# Patient Record
Sex: FEMALE | Race: WHITE | NOT HISPANIC OR LATINO | Employment: OTHER | URBAN - METROPOLITAN AREA
[De-identification: names, ages, dates, MRNs, and addresses within clinical notes are randomized per-mention and may not be internally consistent; named-entity substitution may affect disease eponyms.]

---

## 2017-01-12 PROBLEM — E78.5 HYPERLIPIDEMIA: Status: ACTIVE | Noted: 2017-01-12

## 2017-01-12 PROBLEM — G47.10 ORGANIC HYPERSOMNIA: Status: ACTIVE | Noted: 2017-01-12

## 2017-01-12 PROBLEM — I10 HYPERTENSION: Status: ACTIVE | Noted: 2017-01-12

## 2017-05-18 ENCOUNTER — OFFICE VISIT (OUTPATIENT)
Dept: NEUROLOGY | Facility: CLINIC | Age: 62
End: 2017-05-18

## 2017-05-18 VITALS
BODY MASS INDEX: 25.44 KG/M2 | HEIGHT: 64 IN | WEIGHT: 149 LBS | DIASTOLIC BLOOD PRESSURE: 88 MMHG | SYSTOLIC BLOOD PRESSURE: 130 MMHG

## 2017-05-18 DIAGNOSIS — G40.209 PARTIAL SYMPTOMATIC EPILEPSY WITH COMPLEX PARTIAL SEIZURES, NOT INTRACTABLE, WITHOUT STATUS EPILEPTICUS (HCC): ICD-10-CM

## 2017-05-18 PROCEDURE — 99213 OFFICE O/P EST LOW 20 MIN: CPT | Performed by: PSYCHIATRY & NEUROLOGY

## 2017-05-18 RX ORDER — TRAZODONE HYDROCHLORIDE 50 MG/1
50 TABLET ORAL NIGHTLY
COMMUNITY
End: 2019-03-27

## 2017-05-18 RX ORDER — ATORVASTATIN CALCIUM 20 MG/1
20 TABLET, FILM COATED ORAL DAILY
COMMUNITY

## 2017-05-18 RX ORDER — LAMOTRIGINE 300 MG/1
TABLET, EXTENDED RELEASE ORAL
Qty: 30 TABLET | Refills: 11 | Status: SHIPPED | OUTPATIENT
Start: 2017-05-18 | End: 2018-05-17 | Stop reason: SDUPTHER

## 2017-07-05 ENCOUNTER — TRANSCRIBE ORDERS (OUTPATIENT)
Dept: ADMINISTRATIVE | Facility: HOSPITAL | Age: 62
End: 2017-07-05

## 2017-07-05 DIAGNOSIS — R94.31 ABNORMAL ECG: Primary | ICD-10-CM

## 2017-08-11 ENCOUNTER — PROCEDURE VISIT (OUTPATIENT)
Dept: CARDIOLOGY | Facility: HOSPITAL | Age: 62
End: 2017-08-11

## 2017-08-11 ENCOUNTER — HOSPITAL ENCOUNTER (OUTPATIENT)
Dept: CARDIOLOGY | Facility: HOSPITAL | Age: 62
Discharge: HOME | End: 2017-08-11
Admitting: NURSE PRACTITIONER

## 2017-08-11 ENCOUNTER — OFFICE VISIT (OUTPATIENT)
Dept: CARDIOLOGY | Facility: HOSPITAL | Age: 62
End: 2017-08-11

## 2017-08-11 VITALS
SYSTOLIC BLOOD PRESSURE: 117 MMHG | WEIGHT: 155 LBS | HEIGHT: 64 IN | OXYGEN SATURATION: 95 % | DIASTOLIC BLOOD PRESSURE: 75 MMHG | TEMPERATURE: 97.1 F | RESPIRATION RATE: 18 BRPM | HEART RATE: 67 BPM | BODY MASS INDEX: 26.46 KG/M2

## 2017-08-11 VITALS — HEIGHT: 64 IN | BODY MASS INDEX: 26.46 KG/M2 | WEIGHT: 155 LBS

## 2017-08-11 DIAGNOSIS — E78.2 MIXED HYPERLIPIDEMIA: ICD-10-CM

## 2017-08-11 DIAGNOSIS — I10 ESSENTIAL HYPERTENSION: ICD-10-CM

## 2017-08-11 DIAGNOSIS — R06.02 SHORTNESS OF BREATH: ICD-10-CM

## 2017-08-11 DIAGNOSIS — R94.31 ABNORMAL EKG: Primary | ICD-10-CM

## 2017-08-11 DIAGNOSIS — R94.31 ABNORMAL EKG: ICD-10-CM

## 2017-08-11 LAB
BH CV ECHO MEAS - AO ROOT AREA (BSA CORRECTED): 1.6
BH CV ECHO MEAS - AO ROOT AREA: 6.2 CM^2
BH CV ECHO MEAS - AO ROOT DIAM: 2.8 CM
BH CV ECHO MEAS - BSA(HAYCOCK): 1.8 M^2
BH CV ECHO MEAS - BSA: 1.8 M^2
BH CV ECHO MEAS - BZI_BMI: 26.6 KILOGRAMS/M^2
BH CV ECHO MEAS - BZI_METRIC_HEIGHT: 162.6 CM
BH CV ECHO MEAS - BZI_METRIC_WEIGHT: 70.3 KG
BH CV ECHO MEAS - CONTRAST EF (2CH): 58.8 ML/M^2
BH CV ECHO MEAS - CONTRAST EF 4CH: 52.1 ML/M^2
BH CV ECHO MEAS - EDV(CUBED): 64 ML
BH CV ECHO MEAS - EDV(MOD-SP2): 80 ML
BH CV ECHO MEAS - EDV(MOD-SP4): 73 ML
BH CV ECHO MEAS - EDV(TEICH): 70 ML
BH CV ECHO MEAS - EF(CUBED): 74.7 %
BH CV ECHO MEAS - EF(MOD-SP2): 58.8 %
BH CV ECHO MEAS - EF(MOD-SP4): 52.1 %
BH CV ECHO MEAS - EF(TEICH): 67.2 %
BH CV ECHO MEAS - ESV(CUBED): 16.2 ML
BH CV ECHO MEAS - ESV(MOD-SP2): 33 ML
BH CV ECHO MEAS - ESV(MOD-SP4): 35 ML
BH CV ECHO MEAS - ESV(TEICH): 23 ML
BH CV ECHO MEAS - FS: 36.8 %
BH CV ECHO MEAS - IVS/LVPW: 1.1
BH CV ECHO MEAS - IVSD: 1.2 CM
BH CV ECHO MEAS - LA DIMENSION: 3.3 CM
BH CV ECHO MEAS - LA/AO: 1.2
BH CV ECHO MEAS - LV DIASTOLIC VOL/BSA (35-75): 41.6 ML/M^2
BH CV ECHO MEAS - LV MASS(C)D: 157.4 GRAMS
BH CV ECHO MEAS - LV MASS(C)DI: 89.7 GRAMS/M^2
BH CV ECHO MEAS - LV MAX PG: 3.1 MMHG
BH CV ECHO MEAS - LV MEAN PG: 1 MMHG
BH CV ECHO MEAS - LV SYSTOLIC VOL/BSA (12-30): 19.9 ML/M^2
BH CV ECHO MEAS - LV V1 MAX: 88 CM/SEC
BH CV ECHO MEAS - LV V1 MEAN: 53.9 CM/SEC
BH CV ECHO MEAS - LV V1 VTI: 19.5 CM
BH CV ECHO MEAS - LVIDD: 4 CM
BH CV ECHO MEAS - LVIDS: 2.5 CM
BH CV ECHO MEAS - LVLD AP2: 7.5 CM
BH CV ECHO MEAS - LVLD AP4: 7.2 CM
BH CV ECHO MEAS - LVLS AP2: 6.2 CM
BH CV ECHO MEAS - LVLS AP4: 6.3 CM
BH CV ECHO MEAS - LVOT AREA (M): 2.5 CM^2
BH CV ECHO MEAS - LVOT AREA: 2.5 CM^2
BH CV ECHO MEAS - LVOT DIAM: 1.8 CM
BH CV ECHO MEAS - LVPWD: 1.1 CM
BH CV ECHO MEAS - MV A MAX VEL: 78 CM/SEC
BH CV ECHO MEAS - MV E MAX VEL: 66.6 CM/SEC
BH CV ECHO MEAS - MV E/A: 0.85
BH CV ECHO MEAS - PA ACC SLOPE: 533 CM/SEC^2
BH CV ECHO MEAS - PA ACC TIME: 0.11 SEC
BH CV ECHO MEAS - PA PR(ACCEL): 31.3 MMHG
BH CV ECHO MEAS - RAP SYSTOLE: 8 MMHG
BH CV ECHO MEAS - RVDD: 2.6 CM
BH CV ECHO MEAS - RVSP: 23.1 MMHG
BH CV ECHO MEAS - SI(CUBED): 27.2 ML/M^2
BH CV ECHO MEAS - SI(LVOT): 28.3 ML/M^2
BH CV ECHO MEAS - SI(MOD-SP2): 26.8 ML/M^2
BH CV ECHO MEAS - SI(MOD-SP4): 21.6 ML/M^2
BH CV ECHO MEAS - SI(TEICH): 26.8 ML/M^2
BH CV ECHO MEAS - SV(CUBED): 47.8 ML
BH CV ECHO MEAS - SV(LVOT): 49.6 ML
BH CV ECHO MEAS - SV(MOD-SP2): 47 ML
BH CV ECHO MEAS - SV(MOD-SP4): 38 ML
BH CV ECHO MEAS - SV(TEICH): 47 ML
BH CV ECHO MEAS - TR MAX VEL: 193.7 CM/SEC
BH CV STRESS BP STAGE 1: NORMAL
BH CV STRESS BP STAGE 2: NORMAL
BH CV STRESS BP STAGE 3: NORMAL
BH CV STRESS DURATION MIN STAGE 1: 3
BH CV STRESS DURATION MIN STAGE 2: 3
BH CV STRESS DURATION MIN STAGE 3: 2
BH CV STRESS DURATION SEC STAGE 1: 0
BH CV STRESS DURATION SEC STAGE 2: 0
BH CV STRESS DURATION SEC STAGE 3: 52
BH CV STRESS GRADE STAGE 1: 10
BH CV STRESS GRADE STAGE 2: 12
BH CV STRESS GRADE STAGE 3: 14
BH CV STRESS HR STAGE 1: 109
BH CV STRESS HR STAGE 2: 133
BH CV STRESS HR STAGE 3: 160
BH CV STRESS METS STAGE 1: 5
BH CV STRESS METS STAGE 2: 7.5
BH CV STRESS METS STAGE 3: 10
BH CV STRESS O2 STAGE 1: 97
BH CV STRESS O2 STAGE 2: 96
BH CV STRESS O2 STAGE 3: 96
BH CV STRESS PROTOCOL 1: NORMAL
BH CV STRESS RECOVERY BP: NORMAL MMHG
BH CV STRESS RECOVERY HR: 94 BPM
BH CV STRESS SPEED STAGE 1: 1.7
BH CV STRESS SPEED STAGE 2: 2.5
BH CV STRESS SPEED STAGE 3: 3.4
BH CV STRESS STAGE 1: 1
BH CV STRESS STAGE 2: 2
BH CV STRESS STAGE 3: 3
MAXIMAL PREDICTED HEART RATE: 159 BPM
PERCENT MAX PREDICTED HR: 100.63 %
STRESS BASELINE BP: NORMAL MMHG
STRESS BASELINE HR: 66 BPM
STRESS O2 SAT REST: 97 %
STRESS PERCENT HR: 118 %
STRESS POST ESTIMATED WORKLOAD: 10.1 METS
STRESS POST EXERCISE DUR MIN: 8 MIN
STRESS POST EXERCISE DUR SEC: 52 SEC
STRESS POST O2 SAT PEAK: 96 %
STRESS POST PEAK BP: NORMAL MMHG
STRESS POST PEAK HR: 160 BPM
STRESS TARGET HR: 135 BPM

## 2017-08-11 PROCEDURE — 93005 ELECTROCARDIOGRAM TRACING: CPT

## 2017-08-11 PROCEDURE — 93325 DOPPLER ECHO COLOR FLOW MAPG: CPT | Performed by: INTERNAL MEDICINE

## 2017-08-11 PROCEDURE — 93350 STRESS TTE ONLY: CPT

## 2017-08-11 PROCEDURE — C8928 TTE W OR W/O FOL W/CON,STRES: HCPCS

## 2017-08-11 PROCEDURE — 93010 ELECTROCARDIOGRAM REPORT: CPT | Performed by: INTERNAL MEDICINE

## 2017-08-11 PROCEDURE — 99204 OFFICE O/P NEW MOD 45 MIN: CPT | Performed by: NURSE PRACTITIONER

## 2017-08-11 PROCEDURE — 93018 CV STRESS TEST I&R ONLY: CPT | Performed by: INTERNAL MEDICINE

## 2017-08-11 PROCEDURE — 25010000002 SULFUR HEXAFLUORIDE MICROSPH 60.7-25 MG RECONSTITUTED SUSPENSION: Performed by: NURSE PRACTITIONER

## 2017-08-11 PROCEDURE — 93320 DOPPLER ECHO COMPLETE: CPT

## 2017-08-11 PROCEDURE — 93017 CV STRESS TEST TRACING ONLY: CPT

## 2017-08-11 PROCEDURE — 93320 DOPPLER ECHO COMPLETE: CPT | Performed by: INTERNAL MEDICINE

## 2017-08-11 PROCEDURE — 93350 STRESS TTE ONLY: CPT | Performed by: INTERNAL MEDICINE

## 2017-08-11 PROCEDURE — 93325 DOPPLER ECHO COLOR FLOW MAPG: CPT

## 2017-08-11 RX ORDER — LISINOPRIL AND HYDROCHLOROTHIAZIDE 12.5; 1 MG/1; MG/1
1 TABLET ORAL DAILY
COMMUNITY
Start: 2017-07-03 | End: 2019-09-09

## 2017-08-11 RX ADMIN — SULFUR HEXAFLUORIDE 5 ML: KIT at 11:20

## 2017-08-11 NOTE — PROGRESS NOTES
Encounter Date:08/11/2017      Patient ID: Erin Zavala is a 62 y.o. female.        Subjective:     Chief Complaint: Establish Care and Abnormal ECG     patient [facility] the [facility] today at the request of Dr Bronson Wagner for ongoing evaluation of her recent abnormal ekg. Patient has a history of HTN, HLP and partial epilepsy. Patient notes that she has been experiencing chest tightness, dyspnea on exertion over the last few weeks. Patient presented to her PCP's office and an EKG was completed which showed ST changes.       Chest Pain    This is a recurrent problem. The current episode started in the past 7 days. The onset quality is gradual. The problem occurs intermittently. The problem has been waxing and waning. The pain is present in the epigastric region. The pain is moderate. The quality of the pain is described as tightness. The pain does not radiate. Associated symptoms include abdominal pain and nausea. Pertinent negatives include no claudication, cough, diaphoresis, dizziness, fever, headaches, hemoptysis, irregular heartbeat, malaise/fatigue, near-syncope, orthopnea, palpitations, PND, shortness of breath, sputum production, syncope, vomiting or weakness.   Her past medical history is significant for hyperlipidemia and hypertension.   Her family medical history is significant for CAD.   Shortness of Breath   This is a recurrent problem. The current episode started 1 to 4 weeks ago. The problem occurs intermittently. The problem has been waxing and waning. Associated symptoms include abdominal pain and chest pain. Pertinent negatives include no claudication, fever, headaches, hemoptysis, leg swelling, orthopnea, PND, rash, sputum production, syncope, vomiting or wheezing. The symptoms are aggravated by exercise. The patient has no known risk factors for DVT/PE. She has tried rest for the symptoms. The treatment provided moderate relief.      Patient denies palpitations,  presyncope, syncope, orthopnea,pnd,abdominal fullness, early satiety, claudication, cough or edema.     Patient Active Problem List   Diagnosis   • Partial symptomatic epilepsy with complex partial seizures, not intractable, without status epilepticus   • Hyperlipidemia   • Hypertension   • Organic hypersomnia   • Abnormal EKG   • Shortness of breath     Past Surgical History:   Procedure Laterality Date   • CHOLECYSTECTOMY     • ERCP AND ENDOSCOPY         No Known Allergies      Current Outpatient Prescriptions:   •  atorvastatin (LIPITOR) 20 MG tablet, Take 20 mg by mouth Daily., Disp: , Rfl:   •  Ibuprofen 200 MG capsule, Take  by mouth Daily As Needed., Disp: , Rfl:   •  LamoTRIgine  MG tablet sustained-release 24 hour, Take one tablet every night at bedtime., Disp: 30 tablet, Rfl: 11  •  lisinopril-hydrochlorothiazide (PRINZIDE,ZESTORETIC) 10-12.5 MG per tablet, Take 1 tablet by mouth Daily., Disp: , Rfl:   •  meloxicam (MOBIC) 7.5 MG tablet, Take 7.5 mg by mouth daily., Disp: , Rfl:   •  traZODone (DESYREL) 50 MG tablet, Take 50 mg by mouth Every Night., Disp: , Rfl:     The following portions of the chart were reviewed and updated as appropriate: Allergies, current medications, past family history, social history, past medical history.     Review of Systems   Constitution: Negative for chills, decreased appetite, diaphoresis, fever, weakness, malaise/fatigue, night sweats, weight gain and weight loss.   HENT: Negative for congestion, headaches, hearing loss, hoarse voice and nosebleeds.         Postnasal drip   Eyes: Negative for blurred vision, visual disturbance and visual halos.   Cardiovascular: Positive for chest pain and dyspnea on exertion. Negative for claudication, cyanosis, irregular heartbeat, leg swelling, near-syncope, orthopnea, palpitations, paroxysmal nocturnal dyspnea and syncope.   Respiratory: Negative for cough, hemoptysis, shortness of breath, sleep disturbances due to breathing,  "snoring, sputum production and wheezing.    Hematologic/Lymphatic: Negative for bleeding problem. Does not bruise/bleed easily.   Skin: Negative for dry skin, itching and rash.   Musculoskeletal: Positive for joint pain. Negative for arthritis, joint swelling and myalgias.   Gastrointestinal: Positive for bloating, abdominal pain, diarrhea and nausea. Negative for constipation, flatus, heartburn, hematemesis, hematochezia, melena and vomiting.   Genitourinary: Negative for dysuria, frequency, hematuria, nocturia and urgency.   Neurological: Positive for excessive daytime sleepiness. Negative for dizziness, light-headedness and loss of balance.   Psychiatric/Behavioral: Negative for depression. The patient does not have insomnia and is not nervous/anxious.            Objective:     Vitals:    08/11/17 0846 08/11/17 0847 08/11/17 0848   BP: 123/78 127/76 117/75   BP Location: Right arm Left arm Left arm   Patient Position: Sitting Sitting Standing   Cuff Size: Adult     Pulse: 76  67   Resp: 18     Temp: 97.1 °F (36.2 °C)     TempSrc: Temporal Artery      SpO2: 95%     Weight: 155 lb (70.3 kg)     Height: 64\" (162.6 cm)           Physical Exam   Constitutional: She is oriented to person, place, and time. She appears well-developed and well-nourished. She is active and cooperative. No distress.   HENT:   Head: Normocephalic and atraumatic.   Mouth/Throat: Oropharynx is clear and moist.   Eyes: Conjunctivae and EOM are normal. Pupils are equal, round, and reactive to light.   Neck: Normal range of motion. Neck supple. No JVD present. No tracheal deviation present. No thyromegaly present.   Cardiovascular: Normal rate, regular rhythm, normal heart sounds and intact distal pulses.    Pulmonary/Chest: Effort normal and breath sounds normal.   Abdominal: Soft. Bowel sounds are normal. She exhibits no distension. There is no tenderness.   Musculoskeletal: Normal range of motion.   Neurological: She is alert and oriented to " person, place, and time.   Skin: Skin is warm, dry and intact.   Psychiatric: She has a normal mood and affect. Her behavior is normal.   Nursing note and vitals reviewed.      Lab and Diagnostic Review:    EK/3/17: Sinus rhythm at 83 bpm with negative precordial T waves  EKG today: NSR with low voltage QRS at 65 bpm      Assessment and Plan:         1. Abnormal EKG  CHARLY score; 0  - ECG 12 Lead; Future  - Adult Stress Echo With Color & Doppler; Future    2. Shortness of breath    - Adult Stress Echo With Color & Doppler; Future    3. Essential hypertension  Well controlled on Zestoretic  - Adult Stress Echo With Color & Doppler; Future  HTN Education provided today including signs and symptoms, medication management, daily blood pressure monitoring. Patient encouraged to call the [facility] with any abnormal readings.   4. Mixed hyperlipidemia    - Adult Stress Echo With Color & Doppler; Future  Currently on statin therapy      Further treatment plan pending results of above mentioned tests.     It has been a pleasure to participate in the care of this patient.  Patient was instructed to call the [facility] with any questions, concerns, or worsening symptoms.    * Please note that portions of this note were completed with a voice recognition program. Efforts were made to edit the dictation but occasionally words are transcribed.

## 2017-09-22 ENCOUNTER — HOSPITAL ENCOUNTER (OUTPATIENT)
Dept: CT IMAGING | Facility: HOSPITAL | Age: 62
Discharge: HOME | End: 2017-09-22
Admitting: NURSE PRACTITIONER

## 2017-09-22 ENCOUNTER — TRANSCRIBE ORDERS (OUTPATIENT)
Dept: ADMINISTRATIVE | Facility: HOSPITAL | Age: 62
End: 2017-09-22

## 2017-09-22 DIAGNOSIS — R10.32 LLQ ABDOMINAL PAIN: Primary | ICD-10-CM

## 2017-09-22 DIAGNOSIS — R10.32 LLQ ABDOMINAL PAIN: ICD-10-CM

## 2017-09-22 PROCEDURE — 74176 CT ABD & PELVIS W/O CONTRAST: CPT

## 2017-12-01 ENCOUNTER — TRANSCRIBE ORDERS (OUTPATIENT)
Dept: ADMINISTRATIVE | Facility: HOSPITAL | Age: 62
End: 2017-12-01

## 2017-12-01 DIAGNOSIS — Z12.31 VISIT FOR SCREENING MAMMOGRAM: Primary | ICD-10-CM

## 2018-01-02 ENCOUNTER — HOSPITAL ENCOUNTER (OUTPATIENT)
Dept: MAMMOGRAPHY | Facility: HOSPITAL | Age: 63
Discharge: HOME | End: 2018-01-02
Attending: FAMILY MEDICINE | Admitting: FAMILY MEDICINE

## 2018-01-02 DIAGNOSIS — Z12.31 VISIT FOR SCREENING MAMMOGRAM: ICD-10-CM

## 2018-01-02 PROCEDURE — 77063 BREAST TOMOSYNTHESIS BI: CPT

## 2018-01-02 PROCEDURE — 77067 SCR MAMMO BI INCL CAD: CPT | Performed by: RADIOLOGY

## 2018-01-02 PROCEDURE — 77063 BREAST TOMOSYNTHESIS BI: CPT | Performed by: RADIOLOGY

## 2018-01-02 PROCEDURE — 77067 SCR MAMMO BI INCL CAD: CPT

## 2018-05-17 DIAGNOSIS — G40.209 PARTIAL SYMPTOMATIC EPILEPSY WITH COMPLEX PARTIAL SEIZURES, NOT INTRACTABLE, WITHOUT STATUS EPILEPTICUS (HCC): ICD-10-CM

## 2018-05-17 RX ORDER — LAMOTRIGINE 300 MG/1
TABLET, EXTENDED RELEASE ORAL
Qty: 30 TABLET | Refills: 0 | Status: SHIPPED | OUTPATIENT
Start: 2018-05-17 | End: 2018-06-18 | Stop reason: SDUPTHER

## 2018-05-18 ENCOUNTER — TELEPHONE (OUTPATIENT)
Dept: NEUROLOGY | Facility: CLINIC | Age: 63
End: 2018-05-18

## 2018-05-18 NOTE — TELEPHONE ENCOUNTER
----- Message from Wale Tang sent at 5/18/2018 10:25 AM EDT -----  Contact: ERIN LEWIS (PT)  Erica     PT called to request a refill on her medication (LamoTRIgine  MG tablet sustained-release 24 hour). She has an upcoming appt, but she'll be out of the medication by then.     Please call Erin back: [phone]

## 2018-05-22 ENCOUNTER — OFFICE VISIT (OUTPATIENT)
Dept: NEUROLOGY | Facility: CLINIC | Age: 63
End: 2018-05-22

## 2018-05-22 ENCOUNTER — LAB (OUTPATIENT)
Dept: LAB | Facility: HOSPITAL | Age: 63
End: 2018-05-22

## 2018-05-22 VITALS
HEIGHT: 64 IN | BODY MASS INDEX: 28.17 KG/M2 | WEIGHT: 165 LBS | DIASTOLIC BLOOD PRESSURE: 68 MMHG | SYSTOLIC BLOOD PRESSURE: 118 MMHG

## 2018-05-22 DIAGNOSIS — G40.209 PARTIAL SYMPTOMATIC EPILEPSY WITH COMPLEX PARTIAL SEIZURES, NOT INTRACTABLE, WITHOUT STATUS EPILEPTICUS (HCC): ICD-10-CM

## 2018-05-22 DIAGNOSIS — G40.209 PARTIAL SYMPTOMATIC EPILEPSY WITH COMPLEX PARTIAL SEIZURES, NOT INTRACTABLE, WITHOUT STATUS EPILEPTICUS (HCC): Primary | ICD-10-CM

## 2018-05-22 PROCEDURE — 99214 OFFICE O/P EST MOD 30 MIN: CPT | Performed by: PSYCHIATRY & NEUROLOGY

## 2018-05-22 PROCEDURE — 80175 DRUG SCREEN QUAN LAMOTRIGINE: CPT

## 2018-05-22 PROCEDURE — 36415 COLL VENOUS BLD VENIPUNCTURE: CPT

## 2018-05-22 RX ORDER — ETODOLAC 200 MG/1
200 CAPSULE ORAL
COMMUNITY
End: 2019-03-27

## 2018-05-22 NOTE — PROGRESS NOTES
Subjective   Erin Zavala is a 62 y.o. female.     Chief Complaint   Patient presents with   • Partial symptomatic epilepsy with complex partial seizures,        History of Present Illness   Pt followed for epilepsy that began in 2006. Originally seen here 5/12.  Had spells while either sitting or standing of a very brief loss of consciousness lasting a split second and with this she loses tone and her knees may buckle but she has always regained consciousness in time not to fall. She has no warning for these and notes no lateralized symptoms. One time when ill and vomiting, was lying in bed and lost consciousness briefly, came to and found her arm tight and shaking briefly, wondered if she had had a seizure. She does have a history of syncope which is completely different in terms of typical presyncopal sensations. She has no history of generalized tonic-clonic seizures, abnormal myoclonus or febrile convulsions. She has no history of traumatic brain injury or CNS infection. When she reached LTG dose of 150 mg bid she had no further episodes. She has had a normal brain MRI in 2007 and an EEG at [facility] in 2007 by Dr. Malloy showed left hemisphere slow waves and sharps.  Sleep deprived EEG in 2012 was normal.  Initially did less well with switch to generic. Didn't tolerate 400mg of LTG ER, and decreased back to 300mg, but also sleepy and planned sleep eval when seen October 2014. Had sleep study and has mild MARSHA -- decided not to go with CPAP. Same degree of sleepiness.  Lots of stress, and very hard to concentrate. Raising 6 year old grandson and still working.  5/17: doing OK, has lost weight by cutting portion size, but has not exercised more. No seizures at all. No problems with LTG ER at this dose (300mg). Doesn't think she misses any doses of LTG.  Today: Doing well with no suspicion of seizures since last seen.  However, she ran out of her lamotrigine one time in did not take it for a day  and felt she had better mental clarity which worsened again once she restarted the lamotrigine the next day.  Wondering if she could cut back the dose.  Has been on 300 mg for some years now, and does not want to go off medication but would like to cut back if she can do so safely.  Gen. health is been okay, with no new health problems.  Lamotrigine level 6/14 was 14.7 (2-20).    The following portions of the patient's history were reviewed and updated as appropriate: allergies, current medications, past family history, past medical history, past social history, past surgical history and problem list.    No Known Allergies    Current Outpatient Prescriptions on File Prior to Visit   Medication Sig Dispense Refill   • atorvastatin (LIPITOR) 20 MG tablet Take 20 mg by mouth Daily.     • Ibuprofen 200 MG capsule Take  by mouth Daily As Needed.     • LamoTRIgine  MG tablet sustained-release 24 hour TAKE ONE TABLET BY MOUTH EVERY NIGHT AT BEDTIME 30 tablet 0   • lisinopril-hydrochlorothiazide (PRINZIDE,ZESTORETIC) 10-12.5 MG per tablet Take 1 tablet by mouth Daily.     • traZODone (DESYREL) 50 MG tablet Take 50 mg by mouth Every Night.     • [DISCONTINUED] meloxicam (MOBIC) 7.5 MG tablet Take 7.5 mg by mouth daily.       No current facility-administered medications on file prior to visit.        Past Medical History:   Diagnosis Date   • Diarrhea    • H/O electroencephalogram    • History of MRI 2007    brain , normal    • Hyperlipidemia    • Hypertension    • Seizure    • Vomiting        Past Surgical History:   Procedure Laterality Date   • CHOLECYSTECTOMY     • ERCP AND ENDOSCOPY         Social History     Social History   • Marital status:      Spouse name: N/A   • Number of children: N/A   • Years of education: N/A     Occupational History   • Not on file.     Social History Main Topics   • Smoking status: Never Smoker   • Smokeless tobacco: Never Used   • Alcohol use No      Comment: occasional   •  "Drug use: No   • Sexual activity: Defer     Other Topics Concern   • Not on file     Social History Narrative    Caffeine: 2-3 servings per day    Patient lives at her home with her daughter and grandson.       Review of Systems   Constitutional: Positive for fatigue. Negative for diaphoresis, fever and unexpected weight change.   Respiratory: Negative for cough and shortness of breath.    Cardiovascular: Negative for chest pain and palpitations.   Gastrointestinal: Negative for abdominal pain, nausea and vomiting.   Musculoskeletal: Positive for back pain. Negative for neck pain.   Neurological: Negative for dizziness, syncope, weakness, light-headedness and headaches.   Psychiatric/Behavioral: Negative for confusion.       Objective   Blood pressure 118/68, height 162.6 cm (64.02\"), weight 74.8 kg (165 lb).    Physical Exam   Constitutional: She is oriented to person, place, and time. She appears well-developed and well-nourished.   HENT:   Head: Normocephalic and atraumatic.   Eyes: EOM are normal. Pupils are equal, round, and reactive to light.   Pulmonary/Chest: Effort normal.   Musculoskeletal: Normal range of motion.   Neurological: She is oriented to person, place, and time. She has a normal Finger-Nose-Finger Test and a normal Heel to Shin Test. Gait normal.   Skin: Skin is warm and dry.   Psychiatric: She has a normal mood and affect. Her speech is normal and behavior is normal. Judgment and thought content normal.   Nursing note and vitals reviewed.      Neurologic Exam     Mental Status   Oriented to person, place, and time.   Speech: speech is normal   Level of consciousness: alert  Knowledge: consistent with education.   Able to name object. Normal comprehension.     Cranial Nerves     CN II   Visual fields full to confrontation.     CN III, IV, VI   Pupils are equal, round, and reactive to light.  Extraocular motions are normal.     CN VII   Facial expression full, symmetric.     CN IX, X   CN IX " normal.   CN X normal.   Palate: symmetric    CN XI   CN XI normal.     CN XII   CN XII normal.     Motor Exam   Muscle bulk: normal  Right arm pronator drift: absent  Left arm pronator drift: absent    Strength   Strength 5/5 except as noted.     Sensory Exam   Light touch normal.     Gait, Coordination, and Reflexes     Gait  Gait: normal    Coordination   Finger to nose coordination: normal  Heel to shin coordination: normal    Tremor   Resting tremor: absent  Intention tremor: absent  Action tremor: absent      Assessment/Plan     Erin was seen today for partial symptomatic epilepsy with complex partial seizures, .    Diagnoses and all orders for this visit:    Partial symptomatic epilepsy with complex partial seizures, not intractable, without status epilepticus  -     Lamotrigine Level; Future      Discussion/Summary:  We discussed medication dosing, side effects including cognitive side effects and given her previous fairly high blood level of lamotrigine which I believe was on this dose, it is reasonable to check this again and if still on the higher end of the range, we could potentially cut back by 50 mg.  We discussed insurance coverage of different formulations and pill sizes.  We'll plan on a level now, and I will contact her about likely cutting back to 250 mg pending that level.  We discussed medication compliance and the danger of status epilepticus with abrupt discontinuation of medication.  27 minutes face-to-face, 17 minutes in discussion as above   Return in about 1 year (around 5/22/2019).

## 2018-05-24 LAB — LAMOTRIGINE SERPL-MCNC: 6.4 UG/ML (ref 2–20)

## 2018-06-18 DIAGNOSIS — G40.209 PARTIAL SYMPTOMATIC EPILEPSY WITH COMPLEX PARTIAL SEIZURES, NOT INTRACTABLE, WITHOUT STATUS EPILEPTICUS (HCC): ICD-10-CM

## 2018-06-18 RX ORDER — LAMOTRIGINE 300 MG/1
TABLET, EXTENDED RELEASE ORAL
Qty: 30 TABLET | Refills: 0 | Status: SHIPPED | OUTPATIENT
Start: 2018-06-18 | End: 2018-07-23 | Stop reason: SDUPTHER

## 2018-07-09 ENCOUNTER — TRANSCRIBE ORDERS (OUTPATIENT)
Dept: ADMINISTRATIVE | Facility: HOSPITAL | Age: 63
End: 2018-07-09

## 2018-07-09 ENCOUNTER — HOSPITAL ENCOUNTER (OUTPATIENT)
Dept: GENERAL RADIOLOGY | Facility: HOSPITAL | Age: 63
Discharge: HOME | End: 2018-07-09
Attending: INTERNAL MEDICINE | Admitting: INTERNAL MEDICINE

## 2018-07-09 DIAGNOSIS — M54.5 LOW BACK PAIN, UNSPECIFIED BACK PAIN LATERALITY, UNSPECIFIED CHRONICITY, WITH SCIATICA PRESENCE UNSPECIFIED: Primary | ICD-10-CM

## 2018-07-09 DIAGNOSIS — M54.5 LOW BACK PAIN, UNSPECIFIED BACK PAIN LATERALITY, UNSPECIFIED CHRONICITY, WITH SCIATICA PRESENCE UNSPECIFIED: ICD-10-CM

## 2018-07-09 PROCEDURE — 72110 X-RAY EXAM L-2 SPINE 4/>VWS: CPT

## 2018-07-23 DIAGNOSIS — G40.209 PARTIAL SYMPTOMATIC EPILEPSY WITH COMPLEX PARTIAL SEIZURES, NOT INTRACTABLE, WITHOUT STATUS EPILEPTICUS (HCC): ICD-10-CM

## 2018-07-24 RX ORDER — LAMOTRIGINE 300 MG/1
TABLET, EXTENDED RELEASE ORAL
Qty: 30 TABLET | Refills: 0 | Status: SHIPPED | OUTPATIENT
Start: 2018-07-24 | End: 2018-08-22 | Stop reason: SDUPTHER

## 2018-08-22 DIAGNOSIS — G40.209 PARTIAL SYMPTOMATIC EPILEPSY WITH COMPLEX PARTIAL SEIZURES, NOT INTRACTABLE, WITHOUT STATUS EPILEPTICUS (HCC): ICD-10-CM

## 2018-08-22 RX ORDER — LAMOTRIGINE 300 MG/1
TABLET, EXTENDED RELEASE ORAL
Qty: 30 TABLET | Refills: 0 | Status: SHIPPED | OUTPATIENT
Start: 2018-08-22 | End: 2018-09-22 | Stop reason: SDUPTHER

## 2018-09-22 DIAGNOSIS — G40.209 PARTIAL SYMPTOMATIC EPILEPSY WITH COMPLEX PARTIAL SEIZURES, NOT INTRACTABLE, WITHOUT STATUS EPILEPTICUS (HCC): ICD-10-CM

## 2018-09-24 RX ORDER — LAMOTRIGINE 300 MG/1
TABLET, EXTENDED RELEASE ORAL
Qty: 30 TABLET | Refills: 0 | Status: SHIPPED | OUTPATIENT
Start: 2018-09-24 | End: 2018-10-25 | Stop reason: SDUPTHER

## 2018-10-25 DIAGNOSIS — G40.209 PARTIAL SYMPTOMATIC EPILEPSY WITH COMPLEX PARTIAL SEIZURES, NOT INTRACTABLE, WITHOUT STATUS EPILEPTICUS (HCC): ICD-10-CM

## 2018-10-25 RX ORDER — LAMOTRIGINE 300 MG/1
TABLET, EXTENDED RELEASE ORAL
Qty: 30 TABLET | Refills: 5 | Status: SHIPPED | OUTPATIENT
Start: 2018-10-25 | End: 2019-03-27 | Stop reason: SDUPTHER

## 2018-10-25 NOTE — TELEPHONE ENCOUNTER
Sent in     LamoTRIgine  MG tablet sustained-release 24 hour [Pharmacy Med Name: lamoTRIgine  MG TABLET]  TAKE ONE TABLET BY MOUTH EVERY NIGHT AT BEDTIME   Normal, Disp-30 tablet, R-5    Sent to [facility]

## 2019-03-15 ENCOUNTER — TRANSCRIBE ORDERS (OUTPATIENT)
Dept: ADMINISTRATIVE | Facility: HOSPITAL | Age: 64
End: 2019-03-15

## 2019-03-15 DIAGNOSIS — Z12.31 VISIT FOR SCREENING MAMMOGRAM: Primary | ICD-10-CM

## 2019-03-18 ENCOUNTER — HOSPITAL ENCOUNTER (OUTPATIENT)
Dept: MAMMOGRAPHY | Facility: HOSPITAL | Age: 64
Discharge: HOME | End: 2019-03-18
Admitting: FAMILY MEDICINE

## 2019-03-18 DIAGNOSIS — Z12.31 VISIT FOR SCREENING MAMMOGRAM: ICD-10-CM

## 2019-03-18 PROCEDURE — 77063 BREAST TOMOSYNTHESIS BI: CPT

## 2019-03-18 PROCEDURE — 77067 SCR MAMMO BI INCL CAD: CPT

## 2019-03-18 PROCEDURE — 77063 BREAST TOMOSYNTHESIS BI: CPT | Performed by: RADIOLOGY

## 2019-03-18 PROCEDURE — 77067 SCR MAMMO BI INCL CAD: CPT | Performed by: RADIOLOGY

## 2019-03-27 ENCOUNTER — LAB (OUTPATIENT)
Dept: LAB | Facility: HOSPITAL | Age: 64
End: 2019-03-27

## 2019-03-27 ENCOUNTER — OFFICE VISIT (OUTPATIENT)
Dept: NEUROLOGY | Facility: CLINIC | Age: 64
End: 2019-03-27

## 2019-03-27 VITALS
DIASTOLIC BLOOD PRESSURE: 80 MMHG | HEART RATE: 89 BPM | HEIGHT: 64 IN | WEIGHT: 162 LBS | OXYGEN SATURATION: 95 % | BODY MASS INDEX: 27.66 KG/M2 | SYSTOLIC BLOOD PRESSURE: 118 MMHG

## 2019-03-27 DIAGNOSIS — G40.209 PARTIAL SYMPTOMATIC EPILEPSY WITH COMPLEX PARTIAL SEIZURES, NOT INTRACTABLE, WITHOUT STATUS EPILEPTICUS (HCC): ICD-10-CM

## 2019-03-27 DIAGNOSIS — G40.209 PARTIAL SYMPTOMATIC EPILEPSY WITH COMPLEX PARTIAL SEIZURES, NOT INTRACTABLE, WITHOUT STATUS EPILEPTICUS (HCC): Primary | ICD-10-CM

## 2019-03-27 PROCEDURE — 80175 DRUG SCREEN QUAN LAMOTRIGINE: CPT

## 2019-03-27 PROCEDURE — 99213 OFFICE O/P EST LOW 20 MIN: CPT | Performed by: NURSE PRACTITIONER

## 2019-03-27 PROCEDURE — 36415 COLL VENOUS BLD VENIPUNCTURE: CPT

## 2019-03-27 RX ORDER — LAMOTRIGINE 300 MG/1
1 TABLET, EXTENDED RELEASE ORAL
Qty: 90 TABLET | Refills: 1 | Status: SHIPPED | OUTPATIENT
Start: 2019-03-27 | End: 2019-09-09

## 2019-03-27 RX ORDER — CELECOXIB 200 MG/1
CAPSULE ORAL
COMMUNITY
Start: 2019-03-15

## 2019-03-27 RX ORDER — ACYCLOVIR 800 MG/1
800 TABLET ORAL
COMMUNITY
Start: 2019-01-10

## 2019-03-27 NOTE — PROGRESS NOTES
Subjective:     Patient ID: Erin Zavala is a 63 y.o. female.    CC:   Chief Complaint   Patient presents with   • Seizures       HPI:   History of Present Illness   This is a pleasant 64 yo female who presents for 10 month follow up on partial epilepsy that began in 2006. Has been followed by Dr. Maldonado Neurology for some time and is transitioning care due to prior provider now being neuro-hospitalist. History of spells of confusion. She has no history of traumatic brain injury or CNS infection. Currently on Lamotrigine ER 300mg QHS for seizure prevention. Last seizure in 2008 to her knowledge. She has had a normal brain MRI in 2007 and an EEG at [facility] in 2007 by Dr. Malloy showed left hemisphere slow waves and sharps.  Sleep deprived EEG in 2012 was normal. Did not tolerate generic lamotrigine with increased lethargy. Tolerating ER much better overall.  She is needing refills on her medication today.  There was some discussion previously with Dr. Maldonado about decreasing her lamotrigine dosage due to some issues with concentration.  Her most recent lamotrigine level was checked in May 2018 and was 6.4 with normal being 2-20.  They decided to keep her on the current dose.  She has not had a repeat EEG in some time.  No concerns of recent seizure activity.  She would be willing to have a repeat EEG as well as additional labs today to see if she could possibly decrease the dosing.  She did have a sleep study in 2014 showing mild sleep apnea but decided to avoid CPAP and use sleep position changes at that time.    The following portions of the patient's history were reviewed and updated as appropriate: allergies, current medications, past family history, past medical history, past social history, past surgical history and problem list.    Past Medical History:   Diagnosis Date   • Diarrhea    • H/O electroencephalogram    • History of MRI 2007    brain , normal    • Hyperlipidemia    •  Hypertension    • Seizure (CMS/HCC)    • Vomiting        Past Surgical History:   Procedure Laterality Date   • CHOLECYSTECTOMY     • ERCP AND ENDOSCOPY         Social History     Socioeconomic History   • Marital status:      Spouse name: Not on file   • Number of children: Not on file   • Years of education: Not on file   • Highest education level: Not on file   Tobacco Use   • Smoking status: Never Smoker   • Smokeless tobacco: Never Used   Substance and Sexual Activity   • Alcohol use: No     Comment: occasional   • Drug use: No   • Sexual activity: Defer   Social History Narrative    Caffeine: 2-3 servings per day    Patient lives at her home with her daughter and grandson.       Family History   Problem Relation Age of Onset   • Alzheimer's disease Mother    • Dementia Mother    • Breast cancer Mother 62   • Cancer Mother    • Heart disease Father    • Heart failure Father    • Hypertension Father    • Heart disease Paternal Grandfather    • Heart failure Paternal Grandfather    • Breast cancer Maternal Aunt 62   • Breast cancer Paternal Aunt 42   • No Known Problems Sister    • Leukemia Brother    • Cancer Maternal Grandmother    • Stroke Maternal Grandfather 39   • No Known Problems Paternal Grandmother    • Ovarian cancer Neg Hx         Review of Systems   Constitutional: Negative for chills, fatigue, fever and unexpected weight change.   HENT: Negative for ear pain, hearing loss, nosebleeds, rhinorrhea and sore throat.    Eyes: Negative for photophobia, pain, discharge, itching and visual disturbance.   Respiratory: Negative for cough, chest tightness, shortness of breath and wheezing.    Cardiovascular: Negative for chest pain, palpitations and leg swelling.   Gastrointestinal: Positive for diarrhea. Negative for abdominal pain, blood in stool, constipation, nausea and vomiting.   Genitourinary: Negative for dysuria, frequency, hematuria and urgency.   Musculoskeletal: Negative for arthralgias,  back pain, gait problem, joint swelling, myalgias, neck pain and neck stiffness.   Skin: Negative for rash and wound.   Allergic/Immunologic: Negative for environmental allergies and food allergies.   Neurological: Positive for seizures. Negative for dizziness, tremors, syncope, speech difficulty, weakness, light-headedness, numbness and headaches.   Hematological: Negative for adenopathy. Does not bruise/bleed easily.   Psychiatric/Behavioral: Positive for sleep disturbance. Negative for agitation, confusion, decreased concentration, hallucinations and suicidal ideas. The patient is not nervous/anxious.    All other systems reviewed and are negative.       Objective:    Neurologic Exam     Mental Status   Oriented to person, place, and time.   Level of consciousness: alert    Cranial Nerves   Cranial nerves II through XII intact.     Motor Exam   Muscle bulk: normal  Overall muscle tone: normal    Strength   Strength 5/5 throughout.     Gait, Coordination, and Reflexes     Gait  Gait: normal    Coordination   Finger to nose coordination: normal    Tremor   Resting tremor: absent  Intention tremor: absent  Action tremor: absent    Reflexes   Right brachioradialis: 2+  Left brachioradialis: 2+  Right biceps: 2+  Left biceps: 2+  Right triceps: 2+  Left triceps: 2+  Right patellar: 2+  Left patellar: 2+  Right achilles: 2+  Left achilles: 2+  Right : 2+  Left : 2+      Physical Exam   Constitutional: She is oriented to person, place, and time.   Neurological: She is oriented to person, place, and time. She has normal strength. She has a normal Finger-Nose-Finger Test. Gait normal.   Reflex Scores:       Tricep reflexes are 2+ on the right side and 2+ on the left side.       Bicep reflexes are 2+ on the right side and 2+ on the left side.       Brachioradialis reflexes are 2+ on the right side and 2+ on the left side.       Patellar reflexes are 2+ on the right side and 2+ on the left side.       Achilles  reflexes are 2+ on the right side and 2+ on the left side.      Assessment/Plan:       Erin was seen today for seizures.    Diagnoses and all orders for this visit:    Partial symptomatic epilepsy with complex partial seizures, not intractable, without status epilepticus (CMS/HCC)  -     EEG Awake or Drowsy Routine  -     Lamotrigine Level; Future  -     lamoTRIgine  MG tablet sustained-release 24 hour; Take 1 tablet by mouth every night at bedtime.         She will continue her lamotrigine  mg at night for now.  We will repeat EEG and if this is normal and her lamotrigine level is on the high side of normal we could consider decreasing her dose to 250 mg at night.  We would do this very slowly due to increased risk of recurrent seizures.  She has been well controlled for some time now and would like to prevent any recurrence of seizures. Reviewed medications, potential side effects and signs and symptoms to report. Discussed risk versus benefits of treatment plan with patient and/or family-including medications, labs and radiology that may be ordered. Addressed questions and concerns during visit. Patient and/or family verbalized understanding and agree with plan.  Also with her history of hypertension and hyperlipidemia I recommended she start a low-dose aspirin 81 mg daily for stroke and heart attack prevention.    During this visit the following were done:  Labs Reviewed [x]    Labs Ordered [x]    Radiology Reports Reviewed [x]    Radiology Ordered []    PCP Records Reviewed []    Referring Provider Records Reviewed []    ER Records Reviewed []    Hospital Records Reviewed []    History Obtained From Family []    Radiology Images Reviewed []    Other Reviewed [x]    Records Requested []      EMR Dragon/Transcription Disclaimer:  Much of this encounter note is an electronic transcription of spoken language to printed text. Electronic transcription of spoken language may permit erroneous words or  phrases to be inadvertently transcribed. Although I have reviewed the note for such errors, some may still exist in this documentation.      Cristy Bowen, APRN  3/27/2019

## 2019-04-01 LAB — LAMOTRIGINE SERPL-MCNC: 10.4 UG/ML (ref 2–20)

## 2019-04-02 ENCOUNTER — TELEPHONE (OUTPATIENT)
Dept: NEUROLOGY | Facility: CLINIC | Age: 64
End: 2019-04-02

## 2019-04-02 NOTE — TELEPHONE ENCOUNTER
----- Message from LAVELL Gonzalez sent at 4/1/2019  9:19 PM EDT -----  Normal lamotrigene level. Recommend continuing current dosage. Will wait on EEG results. Thanks, LAVELL Obando

## 2019-04-04 ENCOUNTER — TELEPHONE (OUTPATIENT)
Dept: NEUROLOGY | Facility: CLINIC | Age: 64
End: 2019-04-04

## 2019-05-10 ENCOUNTER — TRANSCRIBE ORDERS (OUTPATIENT)
Dept: ADMINISTRATIVE | Facility: HOSPITAL | Age: 64
End: 2019-05-10

## 2019-05-10 ENCOUNTER — HOSPITAL ENCOUNTER (OUTPATIENT)
Dept: GENERAL RADIOLOGY | Facility: HOSPITAL | Age: 64
Discharge: HOME | End: 2019-05-10
Admitting: NURSE PRACTITIONER

## 2019-05-10 DIAGNOSIS — J98.4 RESTRICTIVE AIRWAY DISEASE: Primary | ICD-10-CM

## 2019-05-10 PROCEDURE — 71046 X-RAY EXAM CHEST 2 VIEWS: CPT

## 2019-05-17 ENCOUNTER — APPOINTMENT (OUTPATIENT)
Dept: NEUROLOGY | Facility: HOSPITAL | Age: 64
End: 2019-05-17

## 2019-06-20 ENCOUNTER — HOSPITAL ENCOUNTER (OUTPATIENT)
Dept: GENERAL RADIOLOGY | Facility: HOSPITAL | Age: 64
Discharge: HOME | End: 2019-06-20
Admitting: INTERNAL MEDICINE

## 2019-06-20 ENCOUNTER — TRANSCRIBE ORDERS (OUTPATIENT)
Dept: ADMINISTRATIVE | Facility: HOSPITAL | Age: 64
End: 2019-06-20

## 2019-06-20 DIAGNOSIS — M25.551 RIGHT HIP PAIN: Primary | ICD-10-CM

## 2019-06-20 PROCEDURE — 72202 X-RAY EXAM SI JOINTS 3/> VWS: CPT

## 2019-06-20 PROCEDURE — 73502 X-RAY EXAM HIP UNI 2-3 VIEWS: CPT

## 2019-06-28 ENCOUNTER — HOSPITAL ENCOUNTER (OUTPATIENT)
Dept: NEUROLOGY | Facility: HOSPITAL | Age: 64
Discharge: HOME | End: 2019-06-28
Admitting: NURSE PRACTITIONER

## 2019-06-28 PROCEDURE — 95816 EEG AWAKE AND DROWSY: CPT

## 2019-07-01 ENCOUNTER — TELEPHONE (OUTPATIENT)
Dept: NEUROLOGY | Facility: CLINIC | Age: 64
End: 2019-07-01

## 2019-07-01 NOTE — TELEPHONE ENCOUNTER
----- Message from LAVELL Gonzalez sent at 7/1/2019  8:18 AM EDT -----  EEG appears normal at this time. We will f/u as scheduled in office. We can discuss lowering her lamotrigene ER dosage if she desires at her follow up. Thanks, LAVELL Obando

## 2019-07-18 ENCOUNTER — OFFICE VISIT (OUTPATIENT)
Dept: ORTHOPEDIC SURGERY | Facility: CLINIC | Age: 64
End: 2019-07-18

## 2019-07-18 VITALS — HEIGHT: 64 IN | OXYGEN SATURATION: 98 % | HEART RATE: 74 BPM | WEIGHT: 169.75 LBS | BODY MASS INDEX: 28.98 KG/M2

## 2019-07-18 DIAGNOSIS — M25.551 RIGHT HIP PAIN: Primary | ICD-10-CM

## 2019-07-18 PROCEDURE — 99244 OFF/OP CNSLTJ NEW/EST MOD 40: CPT | Performed by: ORTHOPAEDIC SURGERY

## 2019-07-18 NOTE — PROGRESS NOTES
Orthopaedic Clinic Note: Hip New Patient    Chief Complaint   Patient presents with   • Right Hip - Pain        HPI  Consult from: Breanna Washington MD    Erin Zavala is a 64 y.o. female who presents with right hip pain for 2 month(s). Onset has been atraumatic and gradual nature.  She localizes the pain to the anterior lateral aspect of the hip.  Rates the pain 3/10 on the pain scale.  Is worse with walking, standing but primarily occurs when she flexes and rotates her hip.  She is localizing it to the groin and anterior lateral hip region.  She states that the pain is a constant aching sensation that becomes a sharp stabbing pain with extremes of motion.  Previous treatments have included occasional anti-inflammatories as well as initiation of physical therapy.  Despite these interventions, her pain is continuing and is causing some limitations in daily activities.  She is ambulating with no assistive device upon presentation today.  She is wanting to discuss treatment options as well as the possible cause for her hip pain.    Past Medical History:   Diagnosis Date   • Diarrhea    • H/O electroencephalogram    • History of MRI 2007    brain , normal    • Hyperlipidemia    • Hypertension    • Seizure (CMS/HCC)    • Vomiting       Past Surgical History:   Procedure Laterality Date   • CHOLECYSTECTOMY     • ERCP AND ENDOSCOPY        Family History   Problem Relation Age of Onset   • Alzheimer's disease Mother    • Dementia Mother    • Breast cancer Mother 62   • Cancer Mother    • Heart disease Father    • Heart failure Father    • Hypertension Father    • Heart disease Paternal Grandfather    • Heart failure Paternal Grandfather    • Breast cancer Maternal Aunt 62   • Breast cancer Paternal Aunt 42   • No Known Problems Sister    • Leukemia Brother    • Cancer Maternal Grandmother    • Stroke Maternal Grandfather 39   • No Known Problems Paternal Grandmother    • Ovarian cancer Neg Hx      Social  History     Socioeconomic History   • Marital status: Single     Spouse name: Not on file   • Number of children: Not on file   • Years of education: Not on file   • Highest education level: Not on file   Tobacco Use   • Smoking status: Never Smoker   • Smokeless tobacco: Never Used   Substance and Sexual Activity   • Alcohol use: No     Comment: occasional   • Drug use: No   • Sexual activity: Defer   Social History Narrative    Caffeine: 2-3 servings per day    Patient lives at her home with her daughter and grandson.      Current Outpatient Medications on File Prior to Visit   Medication Sig Dispense Refill   • acyclovir (ZOVIRAX) 800 MG tablet      • aspirin 81 MG tablet Take 81 mg by mouth Daily.     • atorvastatin (LIPITOR) 20 MG tablet Take 20 mg by mouth Daily.     • celecoxib (CeleBREX) 200 MG capsule      • lamoTRIgine  MG tablet sustained-release 24 hour Take 1 tablet by mouth every night at bedtime. 90 tablet 1   • lisinopril-hydrochlorothiazide (PRINZIDE,ZESTORETIC) 10-12.5 MG per tablet Take 1 tablet by mouth Daily.       No current facility-administered medications on file prior to visit.       No Known Allergies     Review of Systems   Constitutional: Positive for fatigue.   HENT: Negative.    Eyes: Negative.    Respiratory: Negative.    Cardiovascular: Negative.    Gastrointestinal: Negative.    Endocrine: Negative.    Genitourinary: Negative.    Musculoskeletal: Positive for arthralgias.   Skin: Negative.    Allergic/Immunologic: Negative.    Neurological: Positive for seizures.   Hematological: Negative.    Psychiatric/Behavioral: Negative.         The patient's Review of Systems was personally reviewed and confirmed as accurate.    The following portions of the patient's history were reviewed and updated as appropriate: allergies, current medications, past family history, past medical history, past social history, past surgical history and problem list.    Physical Exam  Pulse 74, height  "162.6 cm (64\"), weight 77 kg (169 lb 12.1 oz), SpO2 98 %.    Body mass index is 29.14 kg/m².    GENERAL APPEARANCE: awake, alert & oriented x 3, in no acute distress and well developed, well nourished  PSYCH: normal affect  LUNGS:  breathing nonlabored  EYES: sclera anicteric  CARDIOVASCULAR: palpable dorsalis pedis, palpable posterior tibial bilaterally. Capillary refill less than 2 seconds  EXTREMITIES: no clubbing, cyanosis  GAIT:  Normal           Right Hip Exam:  RANGE OF MOTION:   FLEXION CONTRACTURE: None   FLEXION: 110 degrees   INTERNAL ROTATION: 20 degrees at 90 degrees of flexion   EXTERNAL ROTATION: 40 degrees at 90 degrees of flexion    PAIN WITH HIP MOTION: no pain with gentle range of motion.  She does have pain with terminal flexion and internal rotation localized anterior lateral hip  PAIN WITH LOGROLL: no  STINCHFIELD TEST: negative    KNEE EXAM: full knee ROM (0-120), stable to varus/valgus stress at terminal extension and 30 degrees     STRENGTH:  5/5 hip adduction, abduction, flexion. 5/5 strength knee flexion, extension. 5/5 strength ankle dorsiflexion and plantarflexion.     GREATER TROCHANTER BURSAL PAIN:  no     REFLEXES:   PATELLAR 2+/4   ACHILLES 2+/4    CLONUS: negative  STRAIGHT LEG TEST:   negative    SENSATION TO LIGHT TOUCH:  DEEP PERONEAL/SUPERFICIAL PERONEAL/SURAL/SAPHENOUS/TIBIAL:  intact    EDEMA:   no  ERYTHEMA:  no  WOUNDS/INCISIONS: none, no overlying skin problems.      Left Hip Exam:   RANGE OF MOTION:   FLEXION CONTRACTURE: None   FLEXION: 110 degrees   INTERNAL ROTATION: 20 degrees at 90 degrees of flexion   EXTERNAL ROTATION: 40 degrees at 90 degrees of flexion    PAIN WITH HIP MOTION: no  PAIN WITH LOGROLL: no  STINCHFIELD TEST: negative    KNEE EXAM: full knee ROM (0-120), stable to varus/valgus stress at terminal extension and 30 degrees     STRENGTH:  5/5 hip adduction, abduction, flexion. 5/5 strength knee flexion, extension. 5/5 strength ankle dorsiflexion and " plantarflexion.     GREATER TROCHANTER BURSAL PAIN:  no     REFLEXES:   PATELLAR 2+/4   ACHILLES 2+/4    CLONUS: negative  STRAIGHT LEG TEST:   negative    SENSATION TO LIGHT TOUCH:  DEEP PERONEAL/SUPERFICIAL PERONEAL/SURAL/SAPHENOUS/TIBIAL:  intact    EDEMA:   no  ERYTHEMA:  no  WOUNDS/INCISIONS: none, no overlying skin problems.      ------------------------------------------------------------------    LEG LENGTHS:  equal  _____________________________________________________  _____________________________________________________    RADIOGRAPHIC FINDINGS:   Hip radiographs from 6/20/2019 were personally reviewed.  Radiographs demonstrate no acute bony injury or fracture.  Only mild arthritic changes which are symmetric to the contralateral side.    Assessment/Plan:   Diagnosis Plan   1. Right hip pain  [place] Guide For Pain Meds Inj     Patient symptoms appear to be consistent with a likely labral tear of the right hip joint.  Given the patient's age, I recommended an intra-articular injection of cortisone as both a diagnostic and therapeutic measure.  I explained that the labrum can cause inflammation of the hip joint that is exacerbated with extremes of motion.  She is agreeable to this.  We will see her back in 3 months for repeat evaluation.  I encouraged her to you take mental notes on how her pain changes after the injection.    Modesto Hilliard MD  07/18/19  3:13 PM

## 2019-09-09 ENCOUNTER — OFFICE VISIT (OUTPATIENT)
Dept: NEUROLOGY | Facility: CLINIC | Age: 64
End: 2019-09-09

## 2019-09-09 VITALS
OXYGEN SATURATION: 98 % | SYSTOLIC BLOOD PRESSURE: 150 MMHG | WEIGHT: 171 LBS | BODY MASS INDEX: 29.19 KG/M2 | HEART RATE: 83 BPM | HEIGHT: 64 IN | DIASTOLIC BLOOD PRESSURE: 90 MMHG

## 2019-09-09 DIAGNOSIS — G40.209 PARTIAL SYMPTOMATIC EPILEPSY WITH COMPLEX PARTIAL SEIZURES, NOT INTRACTABLE, WITHOUT STATUS EPILEPTICUS (HCC): Primary | ICD-10-CM

## 2019-09-09 PROCEDURE — 99213 OFFICE O/P EST LOW 20 MIN: CPT | Performed by: NURSE PRACTITIONER

## 2019-09-09 RX ORDER — LAMOTRIGINE 300 MG/1
300 TABLET, EXTENDED RELEASE ORAL NIGHTLY
Qty: 7 TABLET | Refills: 0 | Status: SHIPPED | OUTPATIENT
Start: 2019-09-09 | End: 2020-03-03

## 2019-09-09 RX ORDER — LAMOTRIGINE 250 MG/1
250 TABLET, EXTENDED RELEASE ORAL NIGHTLY
Qty: 90 TABLET | Refills: 1 | Status: SHIPPED | OUTPATIENT
Start: 2019-09-09 | End: 2020-02-26

## 2019-09-09 NOTE — PROGRESS NOTES
Subjective:     Patient ID: Erin Zavala is a 64 y.o. female.    CC:   Chief Complaint   Patient presents with   • Seizures       HPI:   History of Present Illness   This is a pleasant 65 yo female who presents for 6 month follow up on partial epilepsy that began in 2006. Had followed with Dr. Maldonado Neurology previously. History of spells of confusion. She has no history of traumatic brain injury or CNS infection. Currently on Lamotrigine ER 300mg QHS for seizure prevention. Last seizure in 2008 to her knowledge. She has had a normal brain MRI in 2007 and an EEG at [facility] in 2007 by Dr. Malloy showed left hemisphere slow waves and sharps. Sleep deprived EEG in 2012 was normal. Did not tolerate generic lamotrigine with increased lethargy. Tolerating ER much better overall. Recent EEG awake and drowsy completely normal 6/29/19. Lamotrigine level 10.4 3/2019 last visit. She is wanting to try and lower her lamotrigine dosage slowly. She is about to go to [facility] for a 2 week trip starting this week. No changes in health. Off anti-HTN. BP elevated today 150/90-to follow up closely with PCP.     She did have a sleep study in 2014 showing mild sleep apnea but decided to avoid CPAP and use sleep position changes at that time.    The following portions of the patient's history were reviewed and updated as appropriate: allergies, current medications, past family history, past medical history, past social history, past surgical history and problem list.    Past Medical History:   Diagnosis Date   • Diarrhea    • H/O electroencephalogram    • History of MRI 2007    brain , normal    • Hyperlipidemia    • Hypertension    • Seizure (CMS/HCC)    • Vomiting        Past Surgical History:   Procedure Laterality Date   • CHOLECYSTECTOMY     • ERCP AND ENDOSCOPY         Social History     Socioeconomic History   • Marital status: Single     Spouse name: Not on file   • Number of children: Not on file    • Years of education: Not on file   • Highest education level: Not on file   Tobacco Use   • Smoking status: Never Smoker   • Smokeless tobacco: Never Used   Substance and Sexual Activity   • Alcohol use: No     Comment: occasional   • Drug use: No   • Sexual activity: Defer   Social History Narrative    Caffeine: 2-3 servings per day    Patient lives at her home with her daughter and grandson.       Family History   Problem Relation Age of Onset   • Alzheimer's disease Mother    • Dementia Mother    • Breast cancer Mother 62   • Cancer Mother    • Heart disease Father    • Heart failure Father    • Hypertension Father    • Heart disease Paternal Grandfather    • Heart failure Paternal Grandfather    • Breast cancer Maternal Aunt 62   • Breast cancer Paternal Aunt 42   • No Known Problems Sister    • Leukemia Brother    • Cancer Maternal Grandmother    • Stroke Maternal Grandfather 39   • No Known Problems Paternal Grandmother    • Ovarian cancer Neg Hx         Review of Systems   Constitutional: Negative for chills, fatigue, fever and unexpected weight change.   HENT: Negative for ear pain, hearing loss, nosebleeds, rhinorrhea and sore throat.    Eyes: Negative for photophobia, pain, discharge, itching and visual disturbance.   Respiratory: Negative for cough, chest tightness, shortness of breath and wheezing.    Cardiovascular: Negative for chest pain, palpitations and leg swelling.   Gastrointestinal: Negative for abdominal pain, blood in stool, constipation, diarrhea, nausea and vomiting.   Genitourinary: Negative for dysuria, frequency, hematuria and urgency.   Musculoskeletal: Negative for arthralgias, back pain, gait problem, joint swelling, myalgias, neck pain and neck stiffness.   Skin: Negative for rash and wound.   Allergic/Immunologic: Negative for environmental allergies and food allergies.   Neurological: Negative for dizziness, tremors, seizures, syncope, speech difficulty, weakness,  light-headedness, numbness and headaches.   Hematological: Negative for adenopathy. Does not bruise/bleed easily.   Psychiatric/Behavioral: Negative for agitation, confusion, decreased concentration, hallucinations, sleep disturbance and suicidal ideas. The patient is not nervous/anxious.         Objective:    Neurologic Exam     Mental Status   Oriented to person, place, and time.   Level of consciousness: alert    Cranial Nerves   Cranial nerves II through XII intact.     Motor Exam   Muscle bulk: normal  Overall muscle tone: normal    Strength   Strength 5/5 throughout.     Gait, Coordination, and Reflexes     Gait  Gait: normal    Coordination   Finger to nose coordination: normal    Tremor   Resting tremor: absent  Intention tremor: absent  Action tremor: absent    Reflexes   Right brachioradialis: 2+  Left brachioradialis: 2+  Right biceps: 2+  Left biceps: 2+  Right triceps: 2+  Left triceps: 2+  Right patellar: 2+  Left patellar: 2+  Right achilles: 2+  Left achilles: 2+  Right : 2+  Left : 2+      Physical Exam   Constitutional: She is oriented to person, place, and time.   Neurological: She is oriented to person, place, and time. She has normal strength. She has a normal Finger-Nose-Finger Test. Gait normal.   Reflex Scores:       Tricep reflexes are 2+ on the right side and 2+ on the left side.       Bicep reflexes are 2+ on the right side and 2+ on the left side.       Brachioradialis reflexes are 2+ on the right side and 2+ on the left side.       Patellar reflexes are 2+ on the right side and 2+ on the left side.       Achilles reflexes are 2+ on the right side and 2+ on the left side.      Assessment/Plan:       Erin was seen today for seizures.    Diagnoses and all orders for this visit:    Partial symptomatic epilepsy with complex partial seizures, not intractable, without status epilepticus (CMS/[facility])  Comments:  normal EEG 6/29/19 and lamotrigine level was normal 10.4 3/2019. lower  dosage very slowly. seizure free since 2008.  Orders:  -     lamoTRIgine  MG tablet sustained-release 24 hour; Take 250 mg by mouth Every Night.  -     lamoTRIgine  MG tablet sustained-release 24 hour; Take 300 mg by mouth Every Night.         She will take the Lamotrigine ER 300mg daily until she returns from her [facility] trip-gave 1 week extra supply to get her through this time. Once she returns she will go down to Lamotrigine ER 250mg daily for seizure prevention over the next 6 months-slow wean. Labs due next visit. F/U in 6 months or sooner if needed. Reviewed medications, potential side effects and signs and symptoms to report. Discussed risk versus benefits of treatment plan with patient and/or family-including medications, labs and radiology that may be ordered. Addressed questions and concerns during visit. Patient and/or family verbalized understanding and agree with plan.    Patient instructions include: No driving or operating heavy machinery for 3 months from onset of most recent seizure. No tub baths when alone. Minimize stress as much as possible. Recommended 7-8 hours of sleep each night. Abstain from alcohol intake. Educated on Antiepileptic medications with possible side effects and signs and symptoms to report if prescribed during visit. Instructed to take seizure medication daily if prescribed. Reviewed potential seizure risk factors. Instructed to call 911 or our office if another seizure does occur.      During this visit the following were done:  Labs Reviewed [x]    Labs Ordered []    Radiology Reports Reviewed []    Radiology Ordered []    PCP Records Reviewed []    Referring Provider Records Reviewed []    ER Records Reviewed []    Hospital Records Reviewed []    History Obtained From Family []    Radiology Images Reviewed []    Other Reviewed [x]  EEG normal 6/29/19  Records Requested []      EMR Dragon/Transcription Disclaimer:  Much of this encounter note is an  electronic transcription of spoken language to printed text. Electronic transcription of spoken language may permit erroneous words or phrases to be inadvertently transcribed. Although I have reviewed the note for such errors, some may still exist in this documentation.      Cristy Bowen, APRN  9/9/2019

## 2020-02-25 DIAGNOSIS — G40.209 PARTIAL SYMPTOMATIC EPILEPSY WITH COMPLEX PARTIAL SEIZURES, NOT INTRACTABLE, WITHOUT STATUS EPILEPTICUS (HCC): ICD-10-CM

## 2020-02-26 RX ORDER — LAMOTRIGINE 250 MG/1
TABLET, EXTENDED RELEASE ORAL
Qty: 30 TABLET | Refills: 1 | Status: SHIPPED | OUTPATIENT
Start: 2020-02-26 | End: 2020-03-03

## 2020-03-03 ENCOUNTER — OFFICE VISIT (OUTPATIENT)
Dept: NEUROLOGY | Facility: CLINIC | Age: 65
End: 2020-03-03

## 2020-03-03 ENCOUNTER — APPOINTMENT (OUTPATIENT)
Dept: LAB | Facility: HOSPITAL | Age: 65
End: 2020-03-03

## 2020-03-03 VITALS
HEART RATE: 83 BPM | SYSTOLIC BLOOD PRESSURE: 142 MMHG | OXYGEN SATURATION: 99 % | WEIGHT: 182 LBS | BODY MASS INDEX: 31.07 KG/M2 | DIASTOLIC BLOOD PRESSURE: 88 MMHG | HEIGHT: 64 IN

## 2020-03-03 DIAGNOSIS — G40.209 PARTIAL SYMPTOMATIC EPILEPSY WITH COMPLEX PARTIAL SEIZURES, NOT INTRACTABLE, WITHOUT STATUS EPILEPTICUS (HCC): Primary | ICD-10-CM

## 2020-03-03 DIAGNOSIS — Z51.81 ENCOUNTER FOR THERAPEUTIC DRUG LEVEL MONITORING: ICD-10-CM

## 2020-03-03 LAB
ALBUMIN SERPL-MCNC: 4.8 G/DL (ref 3.5–5.2)
ALBUMIN/GLOB SERPL: 2.5 G/DL
ALP SERPL-CCNC: 105 U/L (ref 39–117)
ALT SERPL W P-5'-P-CCNC: 35 U/L (ref 1–33)
ANION GAP SERPL CALCULATED.3IONS-SCNC: 13.4 MMOL/L (ref 5–15)
AST SERPL-CCNC: 28 U/L (ref 1–32)
BASOPHILS # BLD AUTO: 0.07 10*3/MM3 (ref 0–0.2)
BASOPHILS NFR BLD AUTO: 1.3 % (ref 0–1.5)
BILIRUB SERPL-MCNC: 0.4 MG/DL (ref 0.2–1.2)
BUN BLD-MCNC: 16 MG/DL (ref 8–23)
BUN/CREAT SERPL: 20.3 (ref 7–25)
CALCIUM SPEC-SCNC: 9.5 MG/DL (ref 8.6–10.5)
CHLORIDE SERPL-SCNC: 104 MMOL/L (ref 98–107)
CO2 SERPL-SCNC: 22.6 MMOL/L (ref 22–29)
CREAT BLD-MCNC: 0.79 MG/DL (ref 0.57–1)
DEPRECATED RDW RBC AUTO: 39.3 FL (ref 37–54)
EOSINOPHIL # BLD AUTO: 0.32 10*3/MM3 (ref 0–0.4)
EOSINOPHIL NFR BLD AUTO: 5.8 % (ref 0.3–6.2)
ERYTHROCYTE [DISTWIDTH] IN BLOOD BY AUTOMATED COUNT: 12 % (ref 12.3–15.4)
GFR SERPL CREATININE-BSD FRML MDRD: 74 ML/MIN/1.73
GLOBULIN UR ELPH-MCNC: 1.9 GM/DL
GLUCOSE BLD-MCNC: 110 MG/DL (ref 65–99)
HCT VFR BLD AUTO: 38.2 % (ref 34–46.6)
HGB BLD-MCNC: 13.2 G/DL (ref 12–15.9)
IMM GRANULOCYTES # BLD AUTO: 0.05 10*3/MM3 (ref 0–0.05)
IMM GRANULOCYTES NFR BLD AUTO: 0.9 % (ref 0–0.5)
LYMPHOCYTES # BLD AUTO: 1.43 10*3/MM3 (ref 0.7–3.1)
LYMPHOCYTES NFR BLD AUTO: 25.7 % (ref 19.6–45.3)
MCH RBC QN AUTO: 31.1 PG (ref 26.6–33)
MCHC RBC AUTO-ENTMCNC: 34.6 G/DL (ref 31.5–35.7)
MCV RBC AUTO: 90.1 FL (ref 79–97)
MONOCYTES # BLD AUTO: 0.53 10*3/MM3 (ref 0.1–0.9)
MONOCYTES NFR BLD AUTO: 9.5 % (ref 5–12)
NEUTROPHILS # BLD AUTO: 3.16 10*3/MM3 (ref 1.7–7)
NEUTROPHILS NFR BLD AUTO: 56.8 % (ref 42.7–76)
NRBC BLD AUTO-RTO: 0 /100 WBC (ref 0–0.2)
PLATELET # BLD AUTO: 279 10*3/MM3 (ref 140–450)
PMV BLD AUTO: 9.9 FL (ref 6–12)
POTASSIUM BLD-SCNC: 4.2 MMOL/L (ref 3.5–5.2)
PROT SERPL-MCNC: 6.7 G/DL (ref 6–8.5)
RBC # BLD AUTO: 4.24 10*6/MM3 (ref 3.77–5.28)
SODIUM BLD-SCNC: 140 MMOL/L (ref 136–145)
WBC NRBC COR # BLD: 5.56 10*3/MM3 (ref 3.4–10.8)

## 2020-03-03 PROCEDURE — 80175 DRUG SCREEN QUAN LAMOTRIGINE: CPT | Performed by: NURSE PRACTITIONER

## 2020-03-03 PROCEDURE — 80053 COMPREHEN METABOLIC PANEL: CPT | Performed by: NURSE PRACTITIONER

## 2020-03-03 PROCEDURE — 99213 OFFICE O/P EST LOW 20 MIN: CPT | Performed by: NURSE PRACTITIONER

## 2020-03-03 PROCEDURE — 36415 COLL VENOUS BLD VENIPUNCTURE: CPT | Performed by: NURSE PRACTITIONER

## 2020-03-03 PROCEDURE — 85025 COMPLETE CBC W/AUTO DIFF WBC: CPT | Performed by: NURSE PRACTITIONER

## 2020-03-03 RX ORDER — LOSARTAN POTASSIUM 50 MG/1
100 TABLET ORAL DAILY
COMMUNITY
Start: 2020-02-03 | End: 2022-10-21

## 2020-03-03 RX ORDER — LAMOTRIGINE 200 MG/1
200 TABLET, EXTENDED RELEASE ORAL NIGHTLY
Qty: 30 TABLET | Refills: 5 | Status: SHIPPED | OUTPATIENT
Start: 2020-03-03 | End: 2020-08-31

## 2020-03-03 NOTE — PATIENT INSTRUCTIONS
CALL IN 3 MONTHS-AROUND JUNE 2020 IF DOING WELL ON LAMOTRIGINE ER 200MG    AND WE CAN LOWER THIS TO 150MG DAILY AND RE-ORDER EEG TO BE DONE    BEFORE SEPTEMBER APPOINTMENT.

## 2020-03-03 NOTE — PROGRESS NOTES
Subjective:     Patient ID: Erin Zavala is a 64 y.o. female.    CC:   Chief Complaint   Patient presents with   • Seizures       HPI:   History of Present Illness   This is a pleasant 65 yo female who presents for 6 month follow up on partial epilepsy that began in 2006. Previously followed Dr. Maldonado Neurology. Prior spells of confusion confirmed seizures in 2007. She has no history of traumatic brain injury or CNS infection. Currently on Lamotrigine ER 250mg QHS for seizure prevention over the pats 5 months and was previously on 300mg ER daily. She would really like to continue to lower dosage since she has had no confusion, staring spells or other issues with lowering the dosage. Last seizure in 2008 to her knowledge. She has had a normal brain MRI in 2007 and an EEG at [facility] in 2007 by Dr. Malloy showed left hemisphere slow waves and sharps. Sleep deprived EEG in 2012 was normal. Did not tolerate generic lamotrigine with increased lethargy. Recent EEG awake and drowsy completely normal 6/29/19. She just returned from traveling to [facility] with a friend. No new concerns. Due for labs today. Does not sleep well and takes Ambien PRN sleep.      She did have a sleep study in 2014 showing mild sleep apnea but decided to avoid CPAP and use sleep position changes at that time.     The following portions of the patient's history were reviewed and updated as appropriate: allergies, current medications, past family history, past medical history, past social history, past surgical history and problem list.    Past Medical History:   Diagnosis Date   • Diarrhea    • H/O electroencephalogram    • History of MRI 2007    brain , normal    • Hyperlipidemia    • Hypertension    • Seizure (CMS/HCC)    • Vomiting        Past Surgical History:   Procedure Laterality Date   • CHOLECYSTECTOMY     • ERCP AND ENDOSCOPY         Social History     Socioeconomic History   • Marital status: Single     Spouse  name: Not on file   • Number of children: Not on file   • Years of education: Not on file   • Highest education level: Not on file   Tobacco Use   • Smoking status: Never Smoker   • Smokeless tobacco: Never Used   Substance and Sexual Activity   • Alcohol use: No     Comment: occasional   • Drug use: No   • Sexual activity: Not Currently     Birth control/protection: Abstinence, Post-menopausal   Social History Narrative    Caffeine: 2-3 servings per day    Patient lives at her home with her daughter and grandson.       Family History   Problem Relation Age of Onset   • Alzheimer's disease Mother    • Dementia Mother    • Breast cancer Mother 62   • Cancer Mother    • Heart disease Father    • Heart failure Father    • Hypertension Father    • Heart disease Paternal Grandfather    • Heart failure Paternal Grandfather    • Breast cancer Maternal Aunt 62   • Breast cancer Paternal Aunt 42   • No Known Problems Sister    • Leukemia Brother    • Cancer Maternal Grandmother    • Stroke Maternal Grandfather 39   • No Known Problems Paternal Grandmother    • Ovarian cancer Neg Hx         Review of Systems   Constitutional: Negative for chills, fatigue, fever and unexpected weight change.   HENT: Negative for ear pain, hearing loss, nosebleeds, rhinorrhea and sore throat.    Eyes: Negative for photophobia, pain, discharge, itching and visual disturbance.   Respiratory: Negative for cough, chest tightness, shortness of breath and wheezing.    Cardiovascular: Negative for chest pain, palpitations and leg swelling.   Gastrointestinal: Negative for abdominal pain, blood in stool, constipation, diarrhea, nausea and vomiting.   Genitourinary: Negative for dysuria, frequency, hematuria and urgency.   Musculoskeletal: Negative for arthralgias, back pain, gait problem, joint swelling, myalgias, neck pain and neck stiffness.   Skin: Negative for rash and wound.   Allergic/Immunologic: Negative for environmental allergies and food  "allergies.   Neurological: Positive for seizures. Negative for dizziness, tremors, syncope, speech difficulty, weakness, light-headedness, numbness and headaches.   Hematological: Negative for adenopathy. Does not bruise/bleed easily.   Psychiatric/Behavioral: Negative for agitation, confusion, decreased concentration, hallucinations, sleep disturbance and suicidal ideas. The patient is not nervous/anxious.    All other systems reviewed and are negative.       Objective:  /88   Pulse 83   Ht 162.6 cm (64\")   Wt 82.6 kg (182 lb)   SpO2 99%   BMI 31.24 kg/m²     Neurologic Exam     Mental Status   Oriented to person, place, and time.   Speech: speech is normal   Level of consciousness: alert    Cranial Nerves   Cranial nerves II through XII intact.     Motor Exam   Muscle bulk: normal  Overall muscle tone: normal    Strength   Strength 5/5 throughout.     Gait, Coordination, and Reflexes     Gait  Gait: normal    Coordination   Finger to nose coordination: normal    Tremor   Resting tremor: absent  Intention tremor: absent  Action tremor: absent    Reflexes   Right brachioradialis: 2+  Left brachioradialis: 2+  Right biceps: 2+  Left biceps: 2+  Right triceps: 2+  Left triceps: 2+  Right : 2+  Left : 2+      Physical Exam   Constitutional: She is oriented to person, place, and time.   Neurological: She is oriented to person, place, and time. She has normal strength. She has a normal Finger-Nose-Finger Test. Gait normal.   Reflex Scores:       Tricep reflexes are 2+ on the right side and 2+ on the left side.       Bicep reflexes are 2+ on the right side and 2+ on the left side.       Brachioradialis reflexes are 2+ on the right side and 2+ on the left side.  Psychiatric: Her speech is normal.       Assessment/Plan:       Erin was seen today for seizures.    Diagnoses and all orders for this visit:    Partial symptomatic epilepsy with complex partial seizures, not intractable, without status " epilepticus (CMS/HCC)  -     lamoTRIgine  MG tablet sustained-release 24 hour; Take 200 mg by mouth Every Night.  -     Comprehensive Metabolic Panel; Future  -     CBC & Differential; Future  -     Lamotrigine Level; Future    Encounter for therapeutic drug level monitoring  -     Comprehensive Metabolic Panel; Future  -     CBC & Differential; Future  -     Lamotrigine Level; Future           Labs due today. Decrease Lamotrigine ER from 250mg daily to 200mg daily. Consider decreasing again in 3 months and repeating EEG prior to 6 month follow up.  Reviewed medications, potential side effects and signs and symptoms to report. Discussed risk versus benefits of treatment plan with patient and/or family-including medications, labs and radiology that may be ordered. Addressed questions and concerns during visit. Patient and/or family verbalized understanding and agree with plan.    PT TO CALL IN 3 MONTHS-AROUND JUNE 2020 IF DOING WELL ON LAMOTRIGINE ER 200MG AND WE CAN LOWER THIS TO LAMOTRIGINE ER 150MG DAILY AND RE-ORDER EEG TO BE DONE BEFORE SEPTEMBER APPOINTMENT.    Patient instructions include: No driving or operating heavy machinery, solo bathing or tub baths for 3 months from onset of most recent seizure. Minimize stress as much as possible. Recommended 7-8 hours of sleep each night. Abstain from alcohol intake. Educated on Antiepileptic medications with possible side effects and signs and symptoms to report if prescribed during visit. Instructed to take seizure medication daily if prescribed. Reviewed potential seizure risk factors. Instructed to call 911 or our office if another seizure does occur.    During this visit the following were done:  Labs Reviewed []    Labs Ordered [x]    Radiology Reports Reviewed [x]    Radiology Ordered []    PCP Records Reviewed []    Referring Provider Records Reviewed []    ER Records Reviewed []    Hospital Records Reviewed []    History Obtained From Family []     Radiology Images Reviewed []    Other Reviewed [x]    Records Requested []      EMR Dragon/Transcription Disclaimer:  Much of this encounter note is an electronic transcription of spoken language to printed text. Electronic transcription of spoken language may permit erroneous words or phrases to be inadvertently transcribed. Although I have reviewed the note for such errors, some may still exist in this documentation.    Cristy Bowen, APRN  3/3/2020

## 2020-03-04 NOTE — PROGRESS NOTES
Blood work looks good overall. Pending lamictal level. We will keep you posted. Thanks, LAVELL Flores

## 2020-03-05 LAB — LAMOTRIGINE SERPL-MCNC: 7.4 UG/ML (ref 2–20)

## 2020-03-05 NOTE — PROGRESS NOTES
Lamotrigine level looks good overall. Ok to lower dosage as scheduled. Follow up as scheduled in office. Thanks, LAVELL Flores    Fax copy to pcp

## 2020-03-06 ENCOUNTER — TELEPHONE (OUTPATIENT)
Dept: NEUROLOGY | Facility: CLINIC | Age: 65
End: 2020-03-06

## 2020-03-06 NOTE — TELEPHONE ENCOUNTER
----- Message from LAVELL Gonazlez sent at 3/4/2020  4:29 PM EST -----  Blood work looks good overall. Pending lamictal level. We will keep you posted. Thanks, LAVELL Flores

## 2020-03-06 NOTE — TELEPHONE ENCOUNTER
----- Message from LAVELL Gonzalez sent at 3/5/2020  1:58 PM EST -----  Lamotrigine level looks good overall. Ok to lower dosage as scheduled. Follow up as scheduled in office. Thanks, LAVELL Flores    Fax copy to pcp

## 2020-03-09 ENCOUNTER — TELEPHONE (OUTPATIENT)
Dept: NEUROLOGY | Facility: CLINIC | Age: 65
End: 2020-03-09

## 2020-03-09 NOTE — TELEPHONE ENCOUNTER
----- Message from LAVELL Gonzalez sent at 3/4/2020  4:29 PM EST -----  Blood work looks good overall. Pending lamictal level. We will keep you posted. Thanks, LAVELL Flores

## 2020-03-10 ENCOUNTER — TELEPHONE (OUTPATIENT)
Dept: NEUROLOGY | Facility: CLINIC | Age: 65
End: 2020-03-10

## 2020-03-11 ENCOUNTER — TELEPHONE (OUTPATIENT)
Dept: NEUROLOGY | Facility: CLINIC | Age: 65
End: 2020-03-11

## 2020-05-07 ENCOUNTER — TRANSCRIBE ORDERS (OUTPATIENT)
Dept: ADMINISTRATIVE | Facility: HOSPITAL | Age: 65
End: 2020-05-07

## 2020-05-07 DIAGNOSIS — Z12.31 VISIT FOR SCREENING MAMMOGRAM: Primary | ICD-10-CM

## 2020-06-24 ENCOUNTER — APPOINTMENT (OUTPATIENT)
Dept: MAMMOGRAPHY | Facility: HOSPITAL | Age: 65
End: 2020-06-24

## 2020-08-29 DIAGNOSIS — G40.209 PARTIAL SYMPTOMATIC EPILEPSY WITH COMPLEX PARTIAL SEIZURES, NOT INTRACTABLE, WITHOUT STATUS EPILEPTICUS (HCC): ICD-10-CM

## 2020-08-31 RX ORDER — LAMOTRIGINE 200 MG/1
TABLET, EXTENDED RELEASE ORAL
Qty: 30 EACH | Refills: 0 | Status: SHIPPED | OUTPATIENT
Start: 2020-08-31 | End: 2020-09-03 | Stop reason: SDUPTHER

## 2020-09-01 ENCOUNTER — HOSPITAL ENCOUNTER (OUTPATIENT)
Dept: MAMMOGRAPHY | Facility: HOSPITAL | Age: 65
Discharge: HOME | End: 2020-09-01
Admitting: INTERNAL MEDICINE

## 2020-09-01 DIAGNOSIS — Z12.31 VISIT FOR SCREENING MAMMOGRAM: ICD-10-CM

## 2020-09-01 PROCEDURE — 77067 SCR MAMMO BI INCL CAD: CPT

## 2020-09-01 PROCEDURE — 77063 BREAST TOMOSYNTHESIS BI: CPT

## 2020-09-01 PROCEDURE — 77067 SCR MAMMO BI INCL CAD: CPT | Performed by: RADIOLOGY

## 2020-09-01 PROCEDURE — 77063 BREAST TOMOSYNTHESIS BI: CPT | Performed by: RADIOLOGY

## 2020-09-03 ENCOUNTER — TELEMEDICINE (OUTPATIENT)
Dept: NEUROLOGY | Facility: CLINIC | Age: 65
End: 2020-09-03

## 2020-09-03 ENCOUNTER — TELEPHONE (OUTPATIENT)
Dept: NEUROLOGY | Facility: CLINIC | Age: 65
End: 2020-09-03

## 2020-09-03 DIAGNOSIS — G40.209 PARTIAL SYMPTOMATIC EPILEPSY WITH COMPLEX PARTIAL SEIZURES, NOT INTRACTABLE, WITHOUT STATUS EPILEPTICUS (HCC): ICD-10-CM

## 2020-09-03 PROCEDURE — 99212 OFFICE O/P EST SF 10 MIN: CPT | Performed by: NURSE PRACTITIONER

## 2020-09-03 RX ORDER — LAMOTRIGINE 200 MG/1
1 TABLET, EXTENDED RELEASE ORAL
Qty: 30 EACH | Refills: 6 | Status: SHIPPED | OUTPATIENT
Start: 2020-09-03 | End: 2021-03-04

## 2020-09-03 NOTE — TELEPHONE ENCOUNTER
----- Message from LAVELL Gonzalez sent at 9/3/2020 10:14 AM EDT -----  Please call and schedule in person-due for labs- follow up on wed/thurs mid morning to middle of the day. Thanks.

## 2020-09-03 NOTE — PROGRESS NOTES
Subjective:     Patient ID: Erin Zavala is a 65 y.o. female.    CC:   Chief Complaint   Patient presents with   • Seizures       HPI:   History of Present Illness     Due to COVID-19 Pandemic, this visit was completed face to face via VIDEO by Epic/Obeo with verbal consent to treat obtained from patient.      You have chosen to receive care through a telehealth visit.  Do you consent to use a video/audio connection for your medical care today? Yes    This is a pleasant 64 yo female who presents for 6 month follow up on partial epilepsy that began in 2006. Since last visit her lamotrigine level was 7.4 so we lowered her lamotrigine er from 250mg to 200mg daily and she is tolerating this well. She has had no confusion, staring spells or other issues.  Last seizure in 2008 to her knowledge. No new concerns. Working from home. Has gained about 15lbs with COVID-19 pandemic and being home more. Trying to walk and drink more water and lose that weight. Recently saw PCP 3 weeks ago and losartan increased for HTN. No other changes in health. Would like to continue current dose on lamotrigine until she can come get labs.    Previously followed Dr. Maldon[facility]. Prior spells of confusion confirmed seizures in 2007. She has no history of traumatic brain injury or CNS infection.  She has had a normal brain MRI in 2007 and an EEG at [facility] in 2007 by Dr. Malloy showed left hemisphere slow waves and sharps. Sleep deprived EEG in 2012 was normal. EEG awake and drowsy completely normal 6/29/19. Does not sleep well and takes Ambien PRN sleep.     The following portions of the patient's history were reviewed and updated as appropriate: allergies, current medications, past family history, past medical history, past social history, past surgical history and problem list.    Past Medical History:   Diagnosis Date   • Diarrhea    • H/O electroencephalogram    • History of MRI 2007    brain , normal    •  Hyperlipidemia    • Hypertension    • Seizure (CMS/HCC)    • Vomiting        Past Surgical History:   Procedure Laterality Date   • CHOLECYSTECTOMY     • ERCP AND ENDOSCOPY         Social History     Socioeconomic History   • Marital status:      Spouse name: Not on file   • Number of children: Not on file   • Years of education: Not on file   • Highest education level: Not on file   Tobacco Use   • Smoking status: Never Smoker   • Smokeless tobacco: Never Used   Substance and Sexual Activity   • Alcohol use: No     Comment: occasional   • Drug use: No   • Sexual activity: Not Currently     Birth control/protection: Abstinence, Post-menopausal   Social History Narrative    Caffeine: 2-3 servings per day    Patient lives at her home with her daughter and grandson.       Family History   Problem Relation Age of Onset   • Alzheimer's disease Mother    • Dementia Mother    • Breast cancer Mother 62   • Cancer Mother    • Heart disease Father    • Heart failure Father    • Hypertension Father    • Heart disease Paternal Grandfather    • Heart failure Paternal Grandfather    • Breast cancer Maternal Aunt 62   • Breast cancer Paternal Aunt 42   • No Known Problems Sister    • Leukemia Brother    • Cancer Maternal Grandmother    • Stroke Maternal Grandfather 39   • No Known Problems Paternal Grandmother    • Ovarian cancer Neg Hx         Review of Systems   Constitutional: Negative.    HENT: Negative.    Eyes: Negative.    Respiratory: Negative.    Cardiovascular: Negative.    Gastrointestinal: Negative.    Endocrine: Negative.    Genitourinary: Negative.    Musculoskeletal: Negative.    Skin: Negative.    Allergic/Immunologic: Negative.    Neurological: Negative.  Negative for seizures.   Hematological: Negative.    Psychiatric/Behavioral: Positive for sleep disturbance. Negative for self-injury and suicidal ideas.        Objective:  There were no vitals taken for this visit.  MARCELLA D/T VIDEO VISIT    Neurologic Exam      Mental Status   Oriented to person, place, and time.   Speech: speech is normal   Level of consciousness: alert    Cranial Nerves     CN II   Visual fields full to confrontation.     CN III, IV, VI   Pupils are equal, round, and reactive to light.  Extraocular motions are normal.     CN VII   Facial expression full, symmetric.     CN VIII   CN VIII normal.     CN IX, X   CN IX normal.   CN X normal.     CN XI   CN XI normal.     CN XII   CN XII normal.       Physical Exam   Constitutional: She is oriented to person, place, and time.   Eyes: Pupils are equal, round, and reactive to light. EOM are normal.   Neurological: She is oriented to person, place, and time.   Psychiatric: She has a normal mood and affect. Her speech is normal and behavior is normal. Judgment and thought content normal. Cognition and memory are normal.   MARCELLA FULLY D/T VIDEO VISIT    Assessment/Plan:       Erin was seen today for seizures.    Diagnoses and all orders for this visit:    Partial symptomatic epilepsy with complex partial seizures, not intractable, without status epilepticus (CMS/HCC)  -     lamoTRIgine  MG tablet sustained-release 24 hour; Take 200 mg by mouth every night at bedtime.           Continue current lamotrigine ER 200mg daily. Repeat labs in 6 months with in person follow up and consider weaning to a lower dose. F/U sooner if needed.  Reviewed medications, potential side effects and signs and symptoms to report. Discussed risk versus benefits of treatment plan with patient and/or family-including medications, labs and radiology that may be ordered. Addressed questions and concerns during visit. Patient and/or family verbalized understanding and agree with plan.    Patient instructions include: No driving or operating heavy machinery, solo bathing or tub baths for 3 months from onset of most recent seizure. Minimize stress as much as possible. Recommended 7-8 hours of sleep each night. Abstain from alcohol  intake. Educated on Antiepileptic medications with possible side effects and signs and symptoms to report if prescribed during visit. Instructed to take seizure medication daily if prescribed. Reviewed potential seizure risk factors. Instructed to call 911 or our office if another seizure does occur.    During this visit the following were done:  Labs Reviewed [x]    Labs Ordered []    Radiology Reports Reviewed []    Radiology Ordered []    PCP Records Reviewed []    Referring Provider Records Reviewed []    ER Records Reviewed []    Hospital Records Reviewed []    History Obtained From Family []    Radiology Images Reviewed []    Other Reviewed []    Records Requested []      Cristy Bowen, LAVELL  9/3/2020

## 2021-01-19 ENCOUNTER — IMMUNIZATION (OUTPATIENT)
Dept: VACCINE CLINIC | Facility: HOSPITAL | Age: 66
End: 2021-01-19

## 2021-01-19 PROCEDURE — 0001A: CPT | Performed by: FAMILY MEDICINE

## 2021-01-19 PROCEDURE — 91300 HC SARSCOV02 VAC 30MCG/0.3ML IM: CPT | Performed by: FAMILY MEDICINE

## 2021-02-09 ENCOUNTER — IMMUNIZATION (OUTPATIENT)
Dept: VACCINE CLINIC | Facility: HOSPITAL | Age: 66
End: 2021-02-09

## 2021-02-09 PROCEDURE — 91300 HC SARSCOV02 VAC 30MCG/0.3ML IM: CPT | Performed by: INTERNAL MEDICINE

## 2021-02-09 PROCEDURE — 0002A: CPT | Performed by: INTERNAL MEDICINE

## 2021-03-04 ENCOUNTER — OFFICE VISIT (OUTPATIENT)
Dept: NEUROLOGY | Facility: CLINIC | Age: 66
End: 2021-03-04

## 2021-03-04 ENCOUNTER — LAB (OUTPATIENT)
Dept: LAB | Facility: HOSPITAL | Age: 66
End: 2021-03-04

## 2021-03-04 VITALS
DIASTOLIC BLOOD PRESSURE: 84 MMHG | HEIGHT: 64 IN | SYSTOLIC BLOOD PRESSURE: 130 MMHG | WEIGHT: 188 LBS | BODY MASS INDEX: 32.1 KG/M2 | TEMPERATURE: 98.3 F | OXYGEN SATURATION: 96 % | HEART RATE: 97 BPM

## 2021-03-04 DIAGNOSIS — Z51.81 ENCOUNTER FOR THERAPEUTIC DRUG LEVEL MONITORING: ICD-10-CM

## 2021-03-04 DIAGNOSIS — G40.209 PARTIAL SYMPTOMATIC EPILEPSY WITH COMPLEX PARTIAL SEIZURES, NOT INTRACTABLE, WITHOUT STATUS EPILEPTICUS (HCC): ICD-10-CM

## 2021-03-04 DIAGNOSIS — G40.209 PARTIAL SYMPTOMATIC EPILEPSY WITH COMPLEX PARTIAL SEIZURES, NOT INTRACTABLE, WITHOUT STATUS EPILEPTICUS (HCC): Primary | ICD-10-CM

## 2021-03-04 LAB
BASOPHILS # BLD AUTO: 0.07 10*3/MM3 (ref 0–0.2)
BASOPHILS NFR BLD AUTO: 1.1 % (ref 0–1.5)
DEPRECATED RDW RBC AUTO: 40.4 FL (ref 37–54)
EOSINOPHIL # BLD AUTO: 0.38 10*3/MM3 (ref 0–0.4)
EOSINOPHIL NFR BLD AUTO: 6 % (ref 0.3–6.2)
ERYTHROCYTE [DISTWIDTH] IN BLOOD BY AUTOMATED COUNT: 12.4 % (ref 12.3–15.4)
HCT VFR BLD AUTO: 40.1 % (ref 34–46.6)
HGB BLD-MCNC: 13.7 G/DL (ref 12–15.9)
IMM GRANULOCYTES # BLD AUTO: 0.08 10*3/MM3 (ref 0–0.05)
IMM GRANULOCYTES NFR BLD AUTO: 1.3 % (ref 0–0.5)
LYMPHOCYTES # BLD AUTO: 1.97 10*3/MM3 (ref 0.7–3.1)
LYMPHOCYTES NFR BLD AUTO: 31.2 % (ref 19.6–45.3)
MCH RBC QN AUTO: 30.9 PG (ref 26.6–33)
MCHC RBC AUTO-ENTMCNC: 34.2 G/DL (ref 31.5–35.7)
MCV RBC AUTO: 90.3 FL (ref 79–97)
MONOCYTES # BLD AUTO: 0.62 10*3/MM3 (ref 0.1–0.9)
MONOCYTES NFR BLD AUTO: 9.8 % (ref 5–12)
NEUTROPHILS NFR BLD AUTO: 3.19 10*3/MM3 (ref 1.7–7)
NEUTROPHILS NFR BLD AUTO: 50.6 % (ref 42.7–76)
NRBC BLD AUTO-RTO: 0 /100 WBC (ref 0–0.2)
PLATELET # BLD AUTO: 291 10*3/MM3 (ref 140–450)
PMV BLD AUTO: 9.3 FL (ref 6–12)
RBC # BLD AUTO: 4.44 10*6/MM3 (ref 3.77–5.28)
WBC # BLD AUTO: 6.31 10*3/MM3 (ref 3.4–10.8)

## 2021-03-04 PROCEDURE — 80175 DRUG SCREEN QUAN LAMOTRIGINE: CPT

## 2021-03-04 PROCEDURE — 36415 COLL VENOUS BLD VENIPUNCTURE: CPT

## 2021-03-04 PROCEDURE — 80053 COMPREHEN METABOLIC PANEL: CPT

## 2021-03-04 PROCEDURE — 99213 OFFICE O/P EST LOW 20 MIN: CPT | Performed by: NURSE PRACTITIONER

## 2021-03-04 PROCEDURE — 85025 COMPLETE CBC W/AUTO DIFF WBC: CPT

## 2021-03-04 RX ORDER — LAMOTRIGINE 50 MG/1
50 TABLET, EXTENDED RELEASE ORAL NIGHTLY
Qty: 90 TABLET | Refills: 3 | Status: SHIPPED | OUTPATIENT
Start: 2021-03-04 | End: 2022-01-14

## 2021-03-04 RX ORDER — LAMOTRIGINE 100 MG/1
100 TABLET, EXTENDED RELEASE ORAL NIGHTLY
Qty: 90 TABLET | Refills: 3 | Status: SHIPPED | OUTPATIENT
Start: 2021-03-04 | End: 2022-01-14

## 2021-03-04 NOTE — PROGRESS NOTES
Subjective:     Patient ID: Erin Zavala is a 65 y.o. female.    CC:   Chief Complaint   Patient presents with   • Seizures       HPI:   History of Present Illness   This is a pleasant 64 yo female who presents for 6 month follow up on partial epilepsy that began in 2006. She is currently taking lamotrigine ER 200mg QHS. She does have some focus issues. She is wondering about possibly slowly lowering the dose a little more. She has had no confusion, staring spells or other issues.  Last seizure in 2008 to her knowledge. She does have some anxiety and depression and likes the mood effects with the lamotrigine. She has had both Pfizer COVID 19 vaccines. She is doing well otherwise. She is due for labs today. No new concerns.     Previously followed Dr. Maldonado [facility]. Prior spells of confusion confirmed seizures in 2007. She has no history of traumatic brain injury or CNS infection.  She has had a normal brain MRI in 2007 and an EEG at [facility] in 2007 by Dr. Malloy showed left hemisphere slow waves and sharps. Sleep deprived EEG in 2012 was normal. EEG awake and drowsy completely normal 6/29/19. Does not sleep well and takes Ambien PRN sleep    The following portions of the patient's history were reviewed and updated as appropriate: allergies, current medications, past family history, past medical history, past social history, past surgical history and problem list.    Past Medical History:   Diagnosis Date   • Diarrhea    • H/O electroencephalogram    • History of MRI 2007    brain , normal    • Hyperlipidemia    • Hypertension    • Seizure (CMS/HCC)    • Vomiting        Past Surgical History:   Procedure Laterality Date   • CHOLECYSTECTOMY     • ERCP AND ENDOSCOPY         Social History     Socioeconomic History   • Marital status:      Spouse name: Not on file   • Number of children: Not on file   • Years of education: Not on file   • Highest education level: Not on file   Tobacco  Use   • Smoking status: Never Smoker   • Smokeless tobacco: Never Used   Substance and Sexual Activity   • Alcohol use: No     Comment: occasional   • Drug use: No   • Sexual activity: Not Currently     Birth control/protection: Abstinence, Post-menopausal   Social History Narrative    Caffeine: 2-3 servings per day    Patient lives at her home with her daughter and grandson.       Family History   Problem Relation Age of Onset   • Alzheimer's disease Mother    • Dementia Mother    • Breast cancer Mother 62   • Cancer Mother    • Heart disease Father    • Heart failure Father    • Hypertension Father    • Heart disease Paternal Grandfather    • Heart failure Paternal Grandfather    • Breast cancer Maternal Aunt 62   • Breast cancer Paternal Aunt 42   • No Known Problems Sister    • Leukemia Brother    • Cancer Maternal Grandmother    • Stroke Maternal Grandfather 39   • No Known Problems Paternal Grandmother    • Ovarian cancer Neg Hx         Review of Systems   Constitutional: Negative for chills, fatigue, fever and unexpected weight change.   HENT: Negative for ear pain, hearing loss, nosebleeds, rhinorrhea and sore throat.    Eyes: Negative for photophobia, pain, discharge, itching and visual disturbance.   Respiratory: Negative for cough, chest tightness, shortness of breath and wheezing.    Cardiovascular: Negative for chest pain, palpitations and leg swelling.   Gastrointestinal: Negative for abdominal pain, blood in stool, constipation, diarrhea, nausea and vomiting.   Genitourinary: Negative for dysuria, frequency, hematuria and urgency.   Musculoskeletal: Negative for arthralgias, back pain, gait problem, joint swelling, myalgias, neck pain and neck stiffness.   Skin: Negative for rash and wound.   Allergic/Immunologic: Negative for environmental allergies and food allergies.   Neurological: Positive for seizures. Negative for dizziness, tremors, syncope, speech difficulty, weakness, light-headedness,  "numbness and headaches.   Hematological: Negative for adenopathy. Does not bruise/bleed easily.   Psychiatric/Behavioral: Negative for agitation, confusion, decreased concentration, hallucinations, sleep disturbance and suicidal ideas. The patient is not nervous/anxious.    All other systems reviewed and are negative.       Objective:  /84   Pulse 97   Temp 98.3 °F (36.8 °C)   Ht 162.6 cm (64\")   Wt 85.3 kg (188 lb)   SpO2 96%   BMI 32.27 kg/m²     Neurologic Exam     Mental Status   Oriented to person, place, and time.   Speech: speech is normal   Level of consciousness: alert    Cranial Nerves   Cranial nerves II through XII intact.     Motor Exam   Muscle bulk: normal  Overall muscle tone: normal    Strength   Strength 5/5 throughout.     Gait, Coordination, and Reflexes     Gait  Gait: normal    Coordination   Finger to nose coordination: normal    Tremor   Resting tremor: absent  Intention tremor: absent  Action tremor: absent    Reflexes   Right brachioradialis: 2+  Left brachioradialis: 2+  Right biceps: 2+  Left biceps: 2+  Right patellar: 2+  Left patellar: 2+  Right achilles: 2+  Left achilles: 2+  Right : 2+  Left : 2+      Physical Exam  Neurological:      Mental Status: She is oriented to person, place, and time.      Coordination: Finger-Nose-Finger Test normal.      Gait: Gait is intact.      Deep Tendon Reflexes: Strength normal.      Reflex Scores:       Bicep reflexes are 2+ on the right side and 2+ on the left side.       Brachioradialis reflexes are 2+ on the right side and 2+ on the left side.       Patellar reflexes are 2+ on the right side and 2+ on the left side.       Achilles reflexes are 2+ on the right side and 2+ on the left side.  Psychiatric:         Attention and Perception: Attention and perception normal.         Mood and Affect: Mood normal.         Speech: Speech normal.         Behavior: Behavior normal.         Thought Content: Thought content normal.         " Cognition and Memory: Cognition and memory normal.         Judgment: Judgment normal.         Assessment/Plan:       Diagnoses and all orders for this visit:    1. Partial symptomatic epilepsy with complex partial seizures, not intractable, without status epilepticus (CMS/HCC) (Primary)  -     lamoTRIgine  MG tablet sustained-release 24 hour; Take 1 tablet by mouth Every Night.  Dispense: 90 tablet; Refill: 3  -     lamoTRIgine ER 50 MG tablet sustained-release 24 hour; Take 1 tablet by mouth Every Night.  Dispense: 90 tablet; Refill: 3  -     CBC & Differential; Future  -     Comprehensive Metabolic Panel; Future  -     Lamotrigine Level; Future    2. Encounter for therapeutic drug level monitoring  -     CBC & Differential; Future  -     Comprehensive Metabolic Panel; Future  -     Lamotrigine Level; Future           We will go ahead and lower her lamotrigine ER to 150 mg daily.  We will check labs today.  We will follow-up in 6 months and consider lowering dose again if she desires.  Reviewed medications, potential side effects and signs and symptoms to report. Discussed risk versus benefits of treatment plan with patient and/or family-including medications, labs and radiology that may be ordered. Addressed questions and concerns during visit. Patient and/or family verbalized understanding and agree with plan.    AS THE PROVIDER, I PERSONALLY WORE PPE DURING ENTIRE FACE TO FACE ENCOUNTER IN CLINIC WITH THE PATIENT. PATIENT ALSO WORE PPE DURING ENTIRE FACE TO FACE ENCOUNTER EXCEPT FOR A MAX OF 30 SECONDS DURING NEUROLOGICAL EVALUATION OF CRANIAL NERVES AND THEN MASK WAS PLACED BACK OVER PATIENT FACE FOR REMAINDER OF VISIT. I WASHED MY HANDS BEFORE AND AFTER VISIT.    During this visit the following were done:  Labs Reviewed []    Labs Ordered [x]    Radiology Reports Reviewed []    Radiology Ordered []    PCP Records Reviewed []    Referring Provider Records Reviewed []    ER Records Reviewed []    Hospital  Records Reviewed []    History Obtained From Family []    Radiology Images Reviewed []    Other Reviewed [x]    Records Requested []      Cristy Bowen, APRN  3/4/2021

## 2021-03-05 ENCOUNTER — TELEPHONE (OUTPATIENT)
Dept: NEUROLOGY | Facility: CLINIC | Age: 66
End: 2021-03-05

## 2021-03-05 LAB
ALBUMIN SERPL-MCNC: 5 G/DL (ref 3.5–5.2)
ALBUMIN/GLOB SERPL: 2 G/DL
ALP SERPL-CCNC: 105 U/L (ref 39–117)
ALT SERPL W P-5'-P-CCNC: 39 U/L (ref 1–33)
ANION GAP SERPL CALCULATED.3IONS-SCNC: 13.6 MMOL/L (ref 5–15)
AST SERPL-CCNC: 33 U/L (ref 1–32)
BILIRUB SERPL-MCNC: 0.6 MG/DL (ref 0–1.2)
BUN SERPL-MCNC: 17 MG/DL (ref 8–23)
BUN/CREAT SERPL: 24.6 (ref 7–25)
CALCIUM SPEC-SCNC: 10 MG/DL (ref 8.6–10.5)
CHLORIDE SERPL-SCNC: 107 MMOL/L (ref 98–107)
CO2 SERPL-SCNC: 23.4 MMOL/L (ref 22–29)
CREAT SERPL-MCNC: 0.69 MG/DL (ref 0.57–1)
GFR SERPL CREATININE-BSD FRML MDRD: 86 ML/MIN/1.73
GLOBULIN UR ELPH-MCNC: 2.5 GM/DL
GLUCOSE SERPL-MCNC: 97 MG/DL (ref 65–99)
POTASSIUM SERPL-SCNC: 4.5 MMOL/L (ref 3.5–5.2)
PROT SERPL-MCNC: 7.5 G/DL (ref 6–8.5)
SODIUM SERPL-SCNC: 144 MMOL/L (ref 136–145)

## 2021-03-05 NOTE — PROGRESS NOTES
Please notify the patient that her blood work looks okay overall.  Her liver enzymes are just very slightly elevated and I would recommend she have those repeated in 2 to 3 months with her primary care provider.  Otherwise labs look normal.  Please fax labs to PCP Dr. Breanna Washington.  She will follow-up as scheduled in office.  Thanks, LAVELL Flores.

## 2021-03-05 NOTE — TELEPHONE ENCOUNTER
----- Message from LAVELL Gonzalez sent at 3/5/2021  8:36 AM EST -----  Please notify the patient that her blood work looks okay overall.  Her liver enzymes are just very slightly elevated and I would recommend she have those repeated in 2 to 3 months with her primary care provider.  Otherwise labs look normal.  Please fax labs to PCP Dr. Breanna Washington.  She will follow-up as scheduled in office.  Thanks, LAVELL Flores.

## 2021-03-08 LAB — LAMOTRIGINE SERPL-MCNC: 7.3 UG/ML (ref 2–20)

## 2021-03-09 NOTE — TELEPHONE ENCOUNTER
Informed patient of the results and she is wondering if you can check on the Immature Grans and Immature grans absolute. They was elevated last time and she is concerned. Her brother passed away from Leukemia and her sister has it now. She stated that you can communicate through my chart if you would like.

## 2021-03-10 ENCOUNTER — TELEPHONE (OUTPATIENT)
Dept: NEUROLOGY | Facility: CLINIC | Age: 66
End: 2021-03-10

## 2021-03-10 NOTE — TELEPHONE ENCOUNTER
----- Message from LAVELL Gonzalez sent at 3/10/2021  7:57 AM EST -----  Please notify the patient that her lamotrigine level is within normal range.  She will continue her current medications.  We will follow-up as scheduled in office.  Thanks, LAVELL Flores.

## 2021-03-10 NOTE — PROGRESS NOTES
Please notify the patient that her lamotrigine level is within normal range.  She will continue her current medications.  We will follow-up as scheduled in office.  Thanks, LAVELL Flores.

## 2021-03-10 NOTE — TELEPHONE ENCOUNTER
I would recommend that she see hematology/oncology with the genetic specialist which she mentioned she was going to see because of her siblings. The 2 levels are very minimally out of normal range. Those findings alone with a normal white blood cell count are typically considered normal. Please fax labs to PCP and again with her family history, I would recommend she see a specialist-I do believe she mentioned she was already planning this. If she needs a referral, please let us know. Thanks, Cristy

## 2021-10-13 ENCOUNTER — TRANSCRIBE ORDERS (OUTPATIENT)
Dept: ADMINISTRATIVE | Facility: HOSPITAL | Age: 66
End: 2021-10-13

## 2021-10-13 DIAGNOSIS — Z12.31 VISIT FOR SCREENING MAMMOGRAM: Primary | ICD-10-CM

## 2021-11-04 ENCOUNTER — TRANSCRIBE ORDERS (OUTPATIENT)
Dept: ADMINISTRATIVE | Facility: HOSPITAL | Age: 66
End: 2021-11-04

## 2021-11-04 DIAGNOSIS — G44.019 EPISODIC CLUSTER HEADACHE, NOT INTRACTABLE: Primary | ICD-10-CM

## 2021-12-15 ENCOUNTER — HOSPITAL ENCOUNTER (OUTPATIENT)
Dept: MRI IMAGING | Facility: HOSPITAL | Age: 66
Discharge: HOME | End: 2021-12-15
Admitting: INTERNAL MEDICINE

## 2021-12-15 DIAGNOSIS — G44.019 EPISODIC CLUSTER HEADACHE, NOT INTRACTABLE: ICD-10-CM

## 2021-12-15 PROCEDURE — 70551 MRI BRAIN STEM W/O DYE: CPT

## 2022-01-07 ENCOUNTER — HOSPITAL ENCOUNTER (OUTPATIENT)
Dept: MAMMOGRAPHY | Facility: HOSPITAL | Age: 67
Discharge: HOME | End: 2022-01-07
Admitting: INTERNAL MEDICINE

## 2022-01-07 DIAGNOSIS — Z12.31 VISIT FOR SCREENING MAMMOGRAM: ICD-10-CM

## 2022-01-07 PROCEDURE — 77067 SCR MAMMO BI INCL CAD: CPT

## 2022-01-07 PROCEDURE — 77063 BREAST TOMOSYNTHESIS BI: CPT

## 2022-01-07 PROCEDURE — 77063 BREAST TOMOSYNTHESIS BI: CPT | Performed by: RADIOLOGY

## 2022-01-07 PROCEDURE — 77067 SCR MAMMO BI INCL CAD: CPT | Performed by: RADIOLOGY

## 2022-01-14 ENCOUNTER — OFFICE VISIT (OUTPATIENT)
Dept: NEUROLOGY | Facility: CLINIC | Age: 67
End: 2022-01-14

## 2022-01-14 VITALS
DIASTOLIC BLOOD PRESSURE: 76 MMHG | HEART RATE: 79 BPM | OXYGEN SATURATION: 98 % | BODY MASS INDEX: 26.8 KG/M2 | HEIGHT: 64 IN | WEIGHT: 157 LBS | SYSTOLIC BLOOD PRESSURE: 120 MMHG

## 2022-01-14 DIAGNOSIS — G44.019 EPISODIC CLUSTER HEADACHE, NOT INTRACTABLE: ICD-10-CM

## 2022-01-14 DIAGNOSIS — G40.209 PARTIAL SYMPTOMATIC EPILEPSY WITH COMPLEX PARTIAL SEIZURES, NOT INTRACTABLE, WITHOUT STATUS EPILEPTICUS: Primary | ICD-10-CM

## 2022-01-14 PROBLEM — Z98.890 HISTORY OF LASER ASSISTED IN SITU KERATOMILEUSIS: Status: ACTIVE | Noted: 2022-01-11

## 2022-01-14 PROBLEM — H25.813 COMBINED FORMS OF AGE-RELATED CATARACT OF BOTH EYES: Status: ACTIVE | Noted: 2022-01-11

## 2022-01-14 PROCEDURE — 99214 OFFICE O/P EST MOD 30 MIN: CPT | Performed by: NURSE PRACTITIONER

## 2022-01-14 RX ORDER — SUMATRIPTAN 25 MG/1
25 TABLET, FILM COATED ORAL
COMMUNITY
End: 2022-01-14 | Stop reason: SDUPTHER

## 2022-01-14 RX ORDER — SUMATRIPTAN 25 MG/1
TABLET, FILM COATED ORAL
Qty: 9 TABLET | Refills: 5 | Status: SHIPPED | OUTPATIENT
Start: 2022-01-14 | End: 2022-10-21 | Stop reason: SDUPTHER

## 2022-01-14 RX ORDER — LAMOTRIGINE 100 MG/1
100 TABLET, EXTENDED RELEASE ORAL NIGHTLY
Qty: 90 TABLET | Refills: 3 | Status: SHIPPED | OUTPATIENT
Start: 2022-01-14 | End: 2022-10-21 | Stop reason: SDUPTHER

## 2022-01-14 NOTE — PROGRESS NOTES
"Subjective:     Patient ID: Erin Zavala is a 66 y.o. female.    CC:   Chief Complaint   Patient presents with   • Seizures       HPI:   History of Present Illness   This is a pleasant 67 yo female who presents for 10 month follow up on partial epilepsy that began in 2006. She used to take lamotrigine ER 200mg daily, then in March 2021 we reduced this to 150mg daily and about 2-3 months ago due to insurance changes she reduced this to 100mg ER daily and has had no seizure activity and has tolerated this well. She does have some focus issues. She has had no confusion, staring spells or other issues.  Last seizure in 2008 to her knowledge.     She was on a flight in July 2021 and developed a severe right sided headache with eye pain, felt like needles in her eye, eye watering, lasted 10 minutes of severe pain and then 24 hours of a dull ache. She had some issues with her insurance switching to medicare so she went 2 months without insurance so she didn't see her PCP or explain the symptoms until her regular follow up in November/December 2021. She did have a few more mild episodes since then that caused pain for 10 minutes but no visual changes, no speech changes, no numbness, tingling or weakness with symptoms. Saw PCP and had TSH, CMP, CBC< LDL, FT4 and Sed rate checked and all were normal per provider portal patient showed me during visit today. She did undergo repeat MRI brain w/o contrast 12/15/2021-we reviewed radiology report and imaging today and this shows chronic small vessel ischemic changes and no acute abnormalities. Agree with radiology report. She is on aspirin, statin, HTN therapy. Glucose slightly elevated recently and to be rechecked with PCP. She did take a sumatriptan 25mg at onset of last spell and this did help some. This was around November 2021. She did feel \"very strange\" while taking the medicine but it definitely helped. She is unsure of any triggers. History of migraines " previously when she has menstrual cycles. These headaches were different. PCP has offered oxygen PRN in the future.     Previously followed Dr. [facility]. Prior spells of confusion confirmed seizures in 2007. She has no history of traumatic brain injury or CNS infection.  She has had a normal brain MRI in 2007 and an EEG at [facility] in 2007 by Dr. Malloy showed left hemisphere slow waves and sharps. Sleep deprived EEG in 2012 was normal. EEG awake and drowsy completely normal 6/29/19. Does not sleep well and takes Ambien PRN sleep    The following portions of the patient's history were reviewed and updated as appropriate: allergies, current medications, past family history, past medical history, past social history, past surgical history and problem list.    Past Medical History:   Diagnosis Date   • Diarrhea    • Episodic cluster headache, not intractable 1/14/2022   • H/O electroencephalogram    • History of MRI 2007    brain , normal    • Hyperlipidemia    • Hypertension    • Seizure (HCC)    • Vomiting        Past Surgical History:   Procedure Laterality Date   • CHOLECYSTECTOMY     • ERCP AND ENDOSCOPY         Social History     Socioeconomic History   • Marital status:    Tobacco Use   • Smoking status: Never Smoker   • Smokeless tobacco: Never Used   Vaping Use   • Vaping Use: Never used   Substance and Sexual Activity   • Alcohol use: No     Comment: occasional   • Drug use: No   • Sexual activity: Not Currently     Birth control/protection: Abstinence, Post-menopausal       Family History   Problem Relation Age of Onset   • Alzheimer's disease Mother    • Dementia Mother    • Breast cancer Mother 62   • Cancer Mother    • Heart disease Father    • Heart failure Father    • Hypertension Father    • Heart disease Paternal Grandfather    • Heart failure Paternal Grandfather    • Breast cancer Maternal Aunt 62   • Breast cancer Paternal Aunt 42   • No Known Problems Sister    •  "Leukemia Brother    • Cancer Maternal Grandmother    • Stroke Maternal Grandfather 39   • No Known Problems Paternal Grandmother    • Ovarian cancer Neg Hx         Review of Systems   Constitutional: Negative for chills, fatigue, fever and unexpected weight change.   HENT: Negative for ear pain, hearing loss, nosebleeds, rhinorrhea and sore throat.    Eyes: Negative for photophobia, pain, discharge, itching and visual disturbance.   Respiratory: Negative for cough, chest tightness, shortness of breath and wheezing.    Cardiovascular: Negative for chest pain, palpitations and leg swelling.   Gastrointestinal: Negative for abdominal pain, blood in stool, constipation, diarrhea, nausea and vomiting.   Genitourinary: Negative for dysuria, frequency, hematuria and urgency.   Musculoskeletal: Negative for arthralgias, back pain, gait problem, joint swelling, myalgias, neck pain and neck stiffness.   Skin: Negative for rash and wound.   Allergic/Immunologic: Negative for environmental allergies and food allergies.   Neurological: Positive for seizures and headaches. Negative for dizziness, tremors, syncope, speech difficulty, weakness, light-headedness and numbness.   Hematological: Negative for adenopathy. Does not bruise/bleed easily.   Psychiatric/Behavioral: Negative for agitation, confusion, decreased concentration, hallucinations, sleep disturbance and suicidal ideas. The patient is not nervous/anxious.    All other systems reviewed and are negative.       Objective:  /76   Pulse 79   Ht 162.6 cm (64\")   Wt 71.2 kg (157 lb)   SpO2 98%   BMI 26.95 kg/m²     Neurologic Exam     Mental Status   Oriented to person, place, and time.   Speech: speech is normal   Level of consciousness: alert    Cranial Nerves   Cranial nerves II through XII intact.     Motor Exam   Muscle bulk: normal  Overall muscle tone: normal    Strength   Strength 5/5 throughout.     Gait, Coordination, and Reflexes     Gait  Gait: " normal    Coordination   Finger to nose coordination: normal    Tremor   Resting tremor: absent  Intention tremor: absent  Action tremor: absent    Reflexes   Reflexes 2+ except as noted.       Physical Exam  Constitutional:       Appearance: Normal appearance.   Neurological:      Mental Status: She is alert and oriented to person, place, and time.      Coordination: Finger-Nose-Finger Test normal.      Gait: Gait is intact.      Deep Tendon Reflexes: Strength normal.   Psychiatric:         Mood and Affect: Mood and affect normal.         Speech: Speech normal.         Behavior: Behavior normal.         Thought Content: Thought content normal.         Cognition and Memory: Cognition and memory normal.         Judgment: Judgment normal.         Assessment/Plan:       Diagnoses and all orders for this visit:    1. Partial symptomatic epilepsy with complex partial seizures, not intractable, without status epilepticus (HCC) (Primary)  -     lamoTRIgine  MG tablet sustained-release 24 hour; Take 1 tablet by mouth Every Night.  Dispense: 90 tablet; Refill: 3    2. Episodic cluster headache, not intractable  Comments:  MRI BRAIN 12/15/2021-reviewed-chronic small vessel changes, no acute abnormalities, cont aspirin/statin  Orders:  -     SUMAtriptan (IMITREX) 25 MG tablet; Take one tablet at onset of headache. May repeat dose one time in 2 hours if headache not relieved.  Dispense: 9 tablet; Refill: 5           She will continue the lamotrigine  mg daily.  She has tolerated the reduction in dosage.  Reviewed MRI of the brain.  Agree with sumatriptan as needed for cluster headaches and if worsening or persistent would recommend treatment with oxygen.  If these became increasing or more frequent could consider Emgality injections.  Follow-up in 6 months for reevaluation or sooner if needed.    Reviewed medications, potential side effects and signs and symptoms to report. Discussed risk versus benefits of  treatment plan with patient and/or family-including medications, labs and radiology that may be ordered. Addressed questions and concerns during visit. Patient and/or family verbalized understanding and agree with plan.    AS THE PROVIDER, I PERSONALLY WORE PPE DURING ENTIRE FACE TO FACE ENCOUNTER IN CLINIC WITH THE PATIENT. PATIENT ALSO WORE PPE DURING ENTIRE FACE TO FACE ENCOUNTER EXCEPT FOR A MAX OF 30 SECONDS DURING NEUROLOGICAL EVALUATION OF CRANIAL NERVES AND THEN MASK WAS PLACED BACK OVER PATIENT FACE FOR REMAINDER OF VISIT. I WASHED MY HANDS BEFORE AND AFTER VISIT.    During this visit the following were done:  Labs Reviewed [x]  Sed rate on patient PCP portal normal per patient completed in December when she developed the cluster headache.   Labs Ordered []    Radiology Reports Reviewed [x]    Radiology Ordered []    PCP Records Reviewed []    Referring Provider Records Reviewed []    ER Records Reviewed []    Hospital Records Reviewed []    History Obtained From Family []    Radiology Images Reviewed [x]    Other Reviewed []    Records Requested []      Cristy Bowen, LAVELL  1/14/2022

## 2022-05-04 ENCOUNTER — HOSPITAL ENCOUNTER (OUTPATIENT)
Dept: GENERAL RADIOLOGY | Facility: HOSPITAL | Age: 67
Discharge: HOME | End: 2022-05-04
Admitting: INTERNAL MEDICINE

## 2022-05-04 ENCOUNTER — TRANSCRIBE ORDERS (OUTPATIENT)
Dept: ADMINISTRATIVE | Facility: HOSPITAL | Age: 67
End: 2022-05-04

## 2022-05-04 DIAGNOSIS — N20.0 KIDNEY STONE: ICD-10-CM

## 2022-05-04 DIAGNOSIS — N20.0 KIDNEY STONE: Primary | ICD-10-CM

## 2022-05-04 PROCEDURE — 74018 RADEX ABDOMEN 1 VIEW: CPT

## 2022-10-21 ENCOUNTER — HOSPITAL ENCOUNTER (OUTPATIENT)
Dept: GENERAL RADIOLOGY | Facility: HOSPITAL | Age: 67
Discharge: HOME | End: 2022-10-21
Admitting: NURSE PRACTITIONER

## 2022-10-21 ENCOUNTER — OFFICE VISIT (OUTPATIENT)
Dept: NEUROLOGY | Facility: CLINIC | Age: 67
End: 2022-10-21

## 2022-10-21 VITALS
HEART RATE: 88 BPM | BODY MASS INDEX: 28 KG/M2 | OXYGEN SATURATION: 98 % | DIASTOLIC BLOOD PRESSURE: 82 MMHG | WEIGHT: 164 LBS | SYSTOLIC BLOOD PRESSURE: 132 MMHG | HEIGHT: 64 IN

## 2022-10-21 DIAGNOSIS — M54.16 RIGHT LUMBAR RADICULOPATHY: ICD-10-CM

## 2022-10-21 DIAGNOSIS — G40.209 PARTIAL SYMPTOMATIC EPILEPSY WITH COMPLEX PARTIAL SEIZURES, NOT INTRACTABLE, WITHOUT STATUS EPILEPTICUS: Primary | ICD-10-CM

## 2022-10-21 DIAGNOSIS — G44.019 EPISODIC CLUSTER HEADACHE, NOT INTRACTABLE: ICD-10-CM

## 2022-10-21 DIAGNOSIS — F40.231 SEVERE NEEDLE PHOBIA: ICD-10-CM

## 2022-10-21 PROCEDURE — 99214 OFFICE O/P EST MOD 30 MIN: CPT | Performed by: NURSE PRACTITIONER

## 2022-10-21 PROCEDURE — 72100 X-RAY EXAM L-S SPINE 2/3 VWS: CPT

## 2022-10-21 RX ORDER — MAGNESIUM OXIDE 400 MG/1
200-400 TABLET ORAL DAILY
Qty: 90 TABLET | Refills: 3 | Status: SHIPPED | OUTPATIENT
Start: 2022-10-21

## 2022-10-21 RX ORDER — DIAZEPAM 5 MG/1
TABLET ORAL
Qty: 2 TABLET | Refills: 0 | Status: SHIPPED | OUTPATIENT
Start: 2022-10-21

## 2022-10-21 RX ORDER — HYDROCHLOROTHIAZIDE 25 MG/1
25 TABLET ORAL DAILY
COMMUNITY
Start: 2022-09-29

## 2022-10-21 RX ORDER — AMLODIPINE BESYLATE 10 MG/1
10 TABLET ORAL DAILY
COMMUNITY
Start: 2022-08-01

## 2022-10-21 RX ORDER — SUMATRIPTAN 25 MG/1
TABLET, FILM COATED ORAL
Qty: 9 TABLET | Refills: 5 | Status: SHIPPED | OUTPATIENT
Start: 2022-10-21

## 2022-10-21 RX ORDER — LAMOTRIGINE 100 MG/1
100 TABLET, EXTENDED RELEASE ORAL NIGHTLY
Qty: 90 TABLET | Refills: 3 | Status: SHIPPED | OUTPATIENT
Start: 2022-10-21 | End: 2023-03-24 | Stop reason: SDUPTHER

## 2022-10-21 RX ORDER — ALENDRONATE SODIUM 70 MG/1
70 TABLET ORAL
COMMUNITY
Start: 2022-09-01

## 2022-10-21 NOTE — PROGRESS NOTES
Please let the patient know that the x-ray of her lumbar spine shows advanced arthritic changes.  We will wait to see exactly what the MRI of the lower back shows.  We will keep her posted on additional testing results once received.  Thanks, LAVELL Flores.    Please fax a copy of x-ray to PCP
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
## 2022-10-24 ENCOUNTER — TELEPHONE (OUTPATIENT)
Dept: NEUROLOGY | Facility: CLINIC | Age: 67
End: 2022-10-24

## 2022-10-24 NOTE — TELEPHONE ENCOUNTER
----- Message from LAVELL Gonzalez sent at 10/21/2022  4:28 PM EDT -----  Please let the patient know that the x-ray of her lumbar spine shows advanced arthritic changes.  We will wait to see exactly what the MRI of the lower back shows.  We will keep her posted on additional testing results once received.  Thanks, LAVELL Flores.    Please fax a copy of x-ray to PCP

## 2022-11-29 ENCOUNTER — TRANSCRIBE ORDERS (OUTPATIENT)
Dept: ADMINISTRATIVE | Facility: HOSPITAL | Age: 67
End: 2022-11-29

## 2022-11-29 DIAGNOSIS — Z12.31 VISIT FOR SCREENING MAMMOGRAM: Primary | ICD-10-CM

## 2022-12-10 ENCOUNTER — HOSPITAL ENCOUNTER (OUTPATIENT)
Dept: MRI IMAGING | Facility: HOSPITAL | Age: 67
Discharge: HOME | End: 2022-12-10
Admitting: NURSE PRACTITIONER

## 2022-12-10 DIAGNOSIS — M54.16 RIGHT LUMBAR RADICULOPATHY: ICD-10-CM

## 2022-12-10 PROCEDURE — 72148 MRI LUMBAR SPINE W/O DYE: CPT

## 2022-12-12 NOTE — PROGRESS NOTES
Please let Erin know that she does have multiple levels of bulging disc in her lumbar spine based on the MRI of her lumbar spine she had completed on 12/10/2022.  A few options would be completing physical therapy.  Seeing a pain specialist.  Or being evaluated by a neurosurgeon.  Generally we do recommend physical therapy and pain management initially and then if not improving seeing neurosurgery.  She is scheduled to have the nerve and muscle study of her legs done on 1/4/2023 so this will also be helpful.  Let me know if she would like to try some physical therapy or see a pain specialist in the meantime.  If she does want to see neurosurgery let us know.  Thanks, LAVELL Flores.    Please fax a copy of MRI with my notes to PCP for their information as well.

## 2022-12-13 ENCOUNTER — TELEPHONE (OUTPATIENT)
Dept: NEUROLOGY | Facility: CLINIC | Age: 67
End: 2022-12-13

## 2022-12-13 DIAGNOSIS — M51.36 LUMBAR DEGENERATIVE DISC DISEASE: Primary | ICD-10-CM

## 2022-12-13 NOTE — TELEPHONE ENCOUNTER
----- Message from LAVELL Gonzalez sent at 12/12/2022  5:11 PM EST -----  Please let Erin know that she does have multiple levels of bulging disc in her lumbar spine based on the MRI of her lumbar spine she had completed on 12/10/2022.  A few options would be completing physical therapy.  Seeing a pain specialist.  Or being evaluated by a neurosurgeon.  Generally we do recommend physical therapy and pain management initially and then if not improving seeing neurosurgery.  She is scheduled to have the nerve and muscle study of her legs done on 1/4/2023 so this will also be helpful.  Let me know if she would like to try some physical therapy or see a pain specialist in the meantime.  If she does want to see neurosurgery let us know.  Thanks, LAVELL Flores.    Please fax a copy of MRI with my notes to PCP for their information as well.

## 2022-12-14 NOTE — TELEPHONE ENCOUNTER
Pt aware of the results and recommendations and would like to do both PT with [facility] in [place] and Pain management in [place]

## 2023-01-04 ENCOUNTER — APPOINTMENT (OUTPATIENT)
Dept: NEUROLOGY | Facility: HOSPITAL | Age: 68
End: 2023-01-04

## 2023-01-16 ENCOUNTER — TRANSCRIBE ORDERS (OUTPATIENT)
Dept: ADMINISTRATIVE | Facility: HOSPITAL | Age: 68
End: 2023-01-16
Payer: MEDICARE

## 2023-01-16 ENCOUNTER — HOSPITAL ENCOUNTER (OUTPATIENT)
Dept: GENERAL RADIOLOGY | Facility: HOSPITAL | Age: 68
Discharge: HOME | End: 2023-01-16
Admitting: FAMILY MEDICINE
Payer: MEDICARE

## 2023-01-16 DIAGNOSIS — M25.539 ACUTE WRIST PAIN, UNSPECIFIED LATERALITY: ICD-10-CM

## 2023-01-16 DIAGNOSIS — M25.539 ACUTE WRIST PAIN, UNSPECIFIED LATERALITY: Primary | ICD-10-CM

## 2023-01-16 PROCEDURE — 73110 X-RAY EXAM OF WRIST: CPT

## 2023-01-19 ENCOUNTER — HOSPITAL ENCOUNTER (OUTPATIENT)
Dept: MAMMOGRAPHY | Facility: HOSPITAL | Age: 68
Discharge: HOME | End: 2023-01-19
Admitting: INTERNAL MEDICINE
Payer: MEDICARE

## 2023-01-19 DIAGNOSIS — Z12.31 VISIT FOR SCREENING MAMMOGRAM: ICD-10-CM

## 2023-01-19 PROCEDURE — 77063 BREAST TOMOSYNTHESIS BI: CPT | Performed by: RADIOLOGY

## 2023-01-19 PROCEDURE — 77067 SCR MAMMO BI INCL CAD: CPT

## 2023-01-19 PROCEDURE — 77067 SCR MAMMO BI INCL CAD: CPT | Performed by: RADIOLOGY

## 2023-01-19 PROCEDURE — 77063 BREAST TOMOSYNTHESIS BI: CPT

## 2023-03-24 ENCOUNTER — OFFICE VISIT (OUTPATIENT)
Dept: NEUROLOGY | Facility: CLINIC | Age: 68
End: 2023-03-24
Payer: MEDICARE

## 2023-03-24 VITALS
SYSTOLIC BLOOD PRESSURE: 144 MMHG | BODY MASS INDEX: 29.19 KG/M2 | WEIGHT: 171 LBS | HEIGHT: 64 IN | OXYGEN SATURATION: 94 % | DIASTOLIC BLOOD PRESSURE: 88 MMHG | HEART RATE: 91 BPM

## 2023-03-24 DIAGNOSIS — M43.06 LUMBAR SPONDYLOLYSIS: ICD-10-CM

## 2023-03-24 DIAGNOSIS — M54.16 LUMBAR RADICULOPATHY, RIGHT: ICD-10-CM

## 2023-03-24 DIAGNOSIS — G40.209 PARTIAL SYMPTOMATIC EPILEPSY WITH COMPLEX PARTIAL SEIZURES, NOT INTRACTABLE, WITHOUT STATUS EPILEPTICUS: Primary | ICD-10-CM

## 2023-03-24 PROCEDURE — 3077F SYST BP >= 140 MM HG: CPT | Performed by: NURSE PRACTITIONER

## 2023-03-24 PROCEDURE — 3079F DIAST BP 80-89 MM HG: CPT | Performed by: NURSE PRACTITIONER

## 2023-03-24 PROCEDURE — 1159F MED LIST DOCD IN RCRD: CPT | Performed by: NURSE PRACTITIONER

## 2023-03-24 PROCEDURE — 99214 OFFICE O/P EST MOD 30 MIN: CPT | Performed by: NURSE PRACTITIONER

## 2023-03-24 PROCEDURE — 1160F RVW MEDS BY RX/DR IN RCRD: CPT | Performed by: NURSE PRACTITIONER

## 2023-03-24 RX ORDER — LAMOTRIGINE 100 MG/1
100 TABLET, EXTENDED RELEASE ORAL NIGHTLY
Qty: 90 TABLET | Refills: 3 | Status: SHIPPED | OUTPATIENT
Start: 2023-03-24 | End: 2023-03-24

## 2023-03-24 RX ORDER — MIDAZOLAM 5 MG/.1ML
5 SPRAY NASAL DAILY PRN
Qty: 1 ML | Refills: 0 | Status: SHIPPED | OUTPATIENT
Start: 2023-03-24

## 2023-03-24 RX ORDER — LAMOTRIGINE 50 MG/1
50 TABLET, EXTENDED RELEASE ORAL NIGHTLY
Qty: 90 TABLET | Refills: 3 | Status: SHIPPED | OUTPATIENT
Start: 2023-03-24

## 2023-03-24 NOTE — PROGRESS NOTES
"Subjective:     Patient ID: Erin Zavala is a 67 y.o. female.    CC:   Chief Complaint   Patient presents with   • Seizures   • Back Pain       HPI:   History of Present Illness   Today 3/24/2023-  This is a very pleasant 67-year-old female who presents for a 6-month neurology follow-up on partial complex epilepsy which began in 2006. Last seizure was in 2008. She has been on lamotrigine  mg daily for seizure prevention. She takes magnesium 400 mg daily and sumatriptan (Imitrex) as needed. Last visit in clinic was on 10/21/2022; she was reporting lumbar radiculopathy.     X-ray of the lumbar spine was completed on 10/21/2022 showing advanced degenerative disc changes. MRI of the lumbar spine obtained 12/10/2022 showing multiple levels of bulging disc. Options were presented to the patient as physical therapy, interventional pain specialist, or neurosurgery. Patient wanted to go to physical therapy as well as evaluation with pain management, so those orders were placed. She is here for follow-up.     Today, she confirms she has been able to attend physical therapy. She has attended physical therapy approximately 6 times and has found this helpful. She has been completing water therapy, which she finds very helpful. She has not seen pain management as she was feeling better with physical therapy. She would like to keep pain management as an option because she has \"good days and bad days.\" The pain is not shooting down the right side like it was in the past, but she will occasionally have bad days where this will occur. She is hoping to continue physical therapy for a little longer and states she will be reassessed in 1 month. She denies any numbness or tingling in her private area, but she does occasionally experience pain in her groin only on the right side. She denies any loss of feeling. She denies any sudden loss of bowel or bladder control or sudden weakness in her legs. She did not have her " nerve and muscle study completed; she has a severe needle phobia and decided to cancel. She notes she still has her Valium.     She denies experiencing any seizures or headaches. She has not been taking the magnesium lately; she has not had it refilled as she has not been having any headaches. She has not had to use the Imitrex. She believes she was taking her medicine regularly during her EEG in 2019. She has been taking lamotrigine ER since 2006. She has previously taken 200 mg of lamotrigine ER. Her dose of lamotrigine ER was lowered to 150 mg and then again to 100 mg without any recurrent seizures. She is open to reducing her lamotrigine ER dose to 50 mg and eventually stopping this. It is currently expensive even with her insurance.    She confirms she has fallen in the past year; she tripped over a space heater and fell on her left side. She saw [facility]. She states the only thing that was hurting was her left wrist, but they confirmed she had no fractures.    In the Fall 2022-Began with hip pain and then progressed to Low back pain.  She denies experiencing any weakness. She confirms she experiences low back pain, but states the pain in her inner thigh does not feel as though it is coming from her low back. She denies any loss of control of her bowel or bladder, or sudden numbness or tingling in her perineal region.She is currently taking Celebrex.     Prior significant neurology workup and history:  Partial epilepsy that began in 2006. She does have some focus issues. She has had no confusion, staring spells or other issues.  Last seizure in 2008 to her knowledge.      She was on a flight in July 2021 and developed a severe right sided headache with eye pain, felt like needles in her eye, eye watering, lasted 10 minutes of severe pain and then 24 hours of a dull ache. She had some issues with her insurance switching to medicare so she went 2 months without insurance so she didn't see her  "PCP or explain the symptoms until her regular follow up in November/December 2021. She did have a few more mild episodes since then that caused pain for 10 minutes but no visual changes, no speech changes, no numbness, tingling or weakness with symptoms. Saw PCP and had TSH, CMP, CBC< LDL, FT4 and Sed rate checked and all were normal per provider portal patient showed me during visit today. She did undergo repeat MRI brain w/o contrast 12/15/2021-we reviewed radiology report and imaging previously and this shows chronic small vessel ischemic changes and no acute abnormalities. Agree with radiology report. She is on aspirin, statin, HTN therapy. She did feel \"very strange\" while taking the medicine but it definitely helped. She is unsure of any triggers. History of migraines previously when she has menstrual cycles.      Previously followed Dr. [facility]. Prior spells of confusion confirmed seizures in 2007. She has no history of traumatic brain injury or CNS infection.  She has had a normal brain MRI in 2007 and an EEG at [facility] in 2007 by Dr. Malloy showed left hemisphere slow waves and sharps. Sleep deprived EEG in 2012 was normal. EEG awake and drowsy completely normal 6/29/19. Does not sleep well and takes Ambien PRN sleep     She states she has a severe phobia of needles that has previously caused episodes of syncope. She reports she has been experiencing episodes of vasovagal syncope since she was a child.     partial complex epilepsy, which began in 2006. Her last seizure, to her knowledge, was in 2008. We have also been treating her for episodic cluster headaches with normal neuroimaging as far as no acute abnormalities, and have prescribed sumatriptan low dose to take as needed.     The following portions of the patient's history were reviewed and updated as appropriate: allergies, current medications, past family history, past medical history, past social history, past surgical history " and problem list.    Past Medical History:   Diagnosis Date   • Diarrhea    • Episodic cluster headache, not intractable 01/14/2022   • H/O electroencephalogram    • History of MRI 2007    brain , normal    • Hyperlipidemia    • Hypertension    • Lumbar radiculopathy, right 3/24/2023   • Seizure (HCC)    • Vomiting        Past Surgical History:   Procedure Laterality Date   • CHOLECYSTECTOMY     • ERCP AND ENDOSCOPY         Social History     Socioeconomic History   • Marital status:    Tobacco Use   • Smoking status: Never   • Smokeless tobacco: Never   Vaping Use   • Vaping Use: Never used   Substance and Sexual Activity   • Alcohol use: No     Comment: occasional   • Drug use: No   • Sexual activity: Not Currently     Birth control/protection: Abstinence, Post-menopausal       Family History   Problem Relation Age of Onset   • Alzheimer's disease Mother    • Dementia Mother    • Breast cancer Mother 62   • Cancer Mother    • Heart disease Father    • Heart failure Father    • Hypertension Father    • Leukemia Sister    • Leukemia Brother    • Cancer Maternal Grandmother    • No Known Problems Paternal Grandmother    • Breast cancer Maternal Aunt 62   • Breast cancer Paternal Aunt 42   • Stroke Maternal Grandfather 39   • Heart disease Paternal Grandfather    • Heart failure Paternal Grandfather    • Ovarian cancer Neg Hx           Current Outpatient Medications:   •  acyclovir (ZOVIRAX) 800 MG tablet, Take 1 tablet by mouth., Disp: , Rfl:   •  alendronate (FOSAMAX) 70 MG tablet, Take 1 tablet by mouth Every 7 (Seven) Days., Disp: , Rfl:   •  amLODIPine (NORVASC) 10 MG tablet, Take 1 tablet by mouth Daily., Disp: , Rfl:   •  aspirin 81 MG tablet, Take 1 tablet by mouth Daily., Disp: , Rfl:   •  atorvastatin (LIPITOR) 20 MG tablet, Take 1 tablet by mouth Daily., Disp: , Rfl:   •  celecoxib (CeleBREX) 200 MG capsule, , Disp: , Rfl:   •  diazePAM (VALIUM) 5 MG tablet, Take 1 tablet 30-45 minutes prior to EMG,  may take a 2nd dose just before test. Must have a  during the day, Disp: 2 tablet, Rfl: 0  •  hydroCHLOROthiazide (HYDRODIURIL) 25 MG tablet, Take 1 tablet by mouth Daily., Disp: , Rfl:   •  magnesium oxide (MAG-OX) 400 MG tablet, Take 0.5-1 tablets by mouth Daily., Disp: 90 tablet, Rfl: 3  •  SUMAtriptan (IMITREX) 25 MG tablet, Take one tablet at onset of headache. May repeat dose one time in 2 hours if headache not relieved., Disp: 9 tablet, Rfl: 5  •  Zolpidem Tartrate (AMBIEN PO), Take  by mouth., Disp: , Rfl:   •  lamoTRIgine ER 50 MG tablet sustained-release 24 hour, Take 1 tablet by mouth Every Night., Disp: 90 tablet, Rfl: 3  •  Nayzilam 5 MG/0.1ML solution, 0.1 mL into the nostril(s) as directed by provider Daily As Needed (at onset of seizure, may repeat once in opposite nostril in 10 minutes if needed)., Disp: 1 mL, Rfl: 0     Review of Systems   Constitutional: Negative for chills, fatigue, fever and unexpected weight change.   HENT: Negative for ear pain, hearing loss, nosebleeds, rhinorrhea and sore throat.    Eyes: Negative for photophobia, pain, discharge, itching and visual disturbance.   Respiratory: Negative for cough, chest tightness, shortness of breath and wheezing.    Cardiovascular: Negative for chest pain, palpitations and leg swelling.   Gastrointestinal: Negative for abdominal pain, blood in stool, constipation, diarrhea, nausea and vomiting.   Genitourinary: Negative for dysuria, frequency, hematuria and urgency.   Musculoskeletal: Positive for back pain. Negative for arthralgias, gait problem, joint swelling, myalgias, neck pain and neck stiffness.   Skin: Negative for rash and wound.   Allergic/Immunologic: Negative for environmental allergies and food allergies.   Neurological: Negative for dizziness, tremors, seizures, syncope, speech difficulty, weakness, light-headedness, numbness and headaches.   Hematological: Negative for adenopathy. Does not bruise/bleed easily.  "  Psychiatric/Behavioral: Negative for agitation, confusion, decreased concentration, hallucinations, sleep disturbance and suicidal ideas. The patient is not nervous/anxious.    All other systems reviewed and are negative.       Objective:  /88   Pulse 91   Ht 162.6 cm (64\")   Wt 77.6 kg (171 lb)   SpO2 94%   BMI 29.35 kg/m²     Neurologic Exam     Mental Status   Oriented to person, place, and time.   Speech: speech is normal   Level of consciousness: alert    Cranial Nerves   Cranial nerves II through XII intact.     Motor Exam   Muscle bulk: normal  Overall muscle tone: normal    Strength   Strength 5/5 throughout.     Gait, Coordination, and Reflexes     Gait  Gait: (mild antalgia, improved gait)    Coordination   Finger to nose coordination: normal    Tremor   Resting tremor: absent  Intention tremor: absent  Action tremor: absent    Reflexes   Reflexes 2+ except as noted.   Right : 2+  Left : 2+      Physical Exam  Constitutional:       Appearance: Normal appearance.   Musculoskeletal:      Lumbar back: Decreased range of motion. Negative right straight leg raise test and negative left straight leg raise test.   Neurological:      Mental Status: She is alert and oriented to person, place, and time.      Cranial Nerves: Cranial nerves 2-12 are intact.      Motor: Motor strength is normal.      Coordination: Finger-Nose-Finger Test normal.   Psychiatric:         Mood and Affect: Mood and affect normal.         Speech: Speech normal.           MRI L spine w/o contrast per radiology  IMPRESSION:  1.  Evidence of bilateral spondylolysis at the L4 level contributing to  spondylolisthesis of L4 measuring 7 mm.  2.  Discogenic degenerative changes throughout the lumbar spine. These  findings contribute to central canal narrowing and neural foraminal  narrowing at multiple levels.  3.  Degenerative hypertrophic posterior facet changes throughout the  lumbar spine. These findings contribute to neural " foraminal narrowing at  multiple levels.     This report was finalized on 12/10/2022 6:19 PM by Arturo Duke MD.    Assessment/Plan:       Diagnoses and all orders for this visit:    1. Partial symptomatic epilepsy with complex partial seizures, not intractable, without status epilepticus (HCC) (Primary)  -     Discontinue: lamoTRIgine  MG tablet sustained-release 24 hour; Take 1 tablet by mouth Every Night.  Dispense: 90 tablet; Refill: 3  -     lamoTRIgine ER 50 MG tablet sustained-release 24 hour; Take 1 tablet by mouth Every Night.  Dispense: 90 tablet; Refill: 3  -     Nayzilam 5 MG/0.1ML solution; 0.1 mL into the nostril(s) as directed by provider Daily As Needed (at onset of seizure, may repeat once in opposite nostril in 10 minutes if needed).  Dispense: 1 mL; Refill: 0    2. Lumbar radiculopathy, right  -     Ambulatory Referral to Physical Therapy Evaluate and treat    3. Lumbar spondylolysis  -     Ambulatory Referral to Physical Therapy Evaluate and treat         She would really like to come off the lamotrigine ER slowly. She has had no seizures since 2008. EEG in 2019 was normal. We are going to lower her lamotrigine ER to 50 mg daily. She will take this for 3 months. If she has no breakthrough seizure episodes of any kind, she will go to every other day for 4 to 6 weeks and as long as she tolerates this, she will completely stop the medication. I did prescribe Nayzilam to be a rescue medication for her in case she were to have a seizure.     She decided to cancel her EMG and NCVs. In regards to her low back, she feels like she is improving significantly with her physical therapy. I had already referred her to pain management and she did not schedule. She states that if she does have worsening symptoms, she will call us and consider seeing interventional pain. We reviewed MRI of the lumbar spine results. We again reviewed signs and symptoms of cauda equina syndrome. She is going to follow up in  "6 months in clinic or sooner if needed. She will call us with any additional questions or concerns prior to follow-up. She verbalizes understanding and agrees with plan moving forward.    Reviewed medications, potential side effects and signs and symptoms to report. Discussed risk versus benefits of treatment plan with patient and/or family-including medications, labs and radiology that may be ordered. Addressed questions and concerns during visit. Patient and/or family verbalized understanding and agree with plan.    AS THE PROVIDER, I PERSONALLY WORE PPE DURING ENTIRE FACE TO FACE ENCOUNTER IN CLINIC WITH THE PATIENT. PATIENT ALSO WORE PPE DURING ENTIRE FACE TO FACE ENCOUNTER EXCEPT FOR A MAX OF 30 SECONDS DURING NEUROLOGICAL EVALUATION OF CRANIAL NERVES AND THEN MASK WAS PLACED BACK OVER PATIENT FACE FOR REMAINDER OF VISIT. I WASHED MY HANDS BEFORE AND AFTER VISIT.    Patient instructions include: No driving or operating heavy machinery, solo bathing or tub baths for 90 days from onset of most recent seizure, if they currently hold a license. Minimize stress as much as possible. Recommended 7-8 hours of sleep each night. Abstain from alcohol intake. Educated on Antiepileptic medications with possible side effects and signs and symptoms to report if prescribed during visit. Instructed to take seizure medication daily if prescribed. Reviewed potential seizure risk factors. Instructed to call 911 or our office if another seizure does occur.    Sudden Unexpected Death in Epilepsy is defined as \"the sudden, unexpected, witnessed or unwitnessed, non-traumatic, and non-drowning death in patients with epilepsy with or without evidence for a seizure, and excluding documented status epilepticus, in which postmortem examination does not reveal a structural or toxicological cause for death.\"-Epilepsy Foundation 2021. SUDEP is most commonly seen in Epilepsy patient's who's seizures are not well controlled or who have seizures " during their sleep. This condition is not fully understood. To reduce your risk of SUDEP, please take your seizure medications as prescribed, keep a diary of your seizures and when they occur and if your seizures are not well controlled, please notify us so we can collaborate with you to get your seizures better controlled. If you have additional questions, please visit: [email or web address].com for more information.    As part of this patient's treatment plan I am prescribing controlled substances. The patient has been made aware of appropriate use of such medications, including potential risk of somnolence, limited ability to drive and/or work safely, and potential for dependence or overdose. It has also been made clear that these medications are for use by the patient only, without concomitant use of alcohol or other substances unless prescribed. Keep out of reach of children.  Jass report has been reviewed. If this is going to be prescribed long term, [facility] Controlled Substance Agreement Contract has also been read and signed by patient and myself.    During this visit the following were done:  Labs Reviewed []    Labs Ordered []    Radiology Reports Reviewed [x]    Radiology Ordered []    PCP Records Reviewed []    Referring Provider Records Reviewed []    ER Records Reviewed []    Hospital Records Reviewed []    History Obtained From Family []    Radiology Images Reviewed [x]    Other Reviewed [x]  [facility]ort PT notes reviewed  Records Requested []      Transcribed from ambient dictation for LAVELL Gonzalez by Adelita Talamantes.  03/24/23   16:07 EDT    Patient or patient representative verbalized consent to the visit recording.  I have personally performed the services described in this document as transcribed by the above individual, and it is both accurate and complete.  LAVELL Gonzalez  3/24/2023  16:42 EDT

## 2023-03-28 ENCOUNTER — PRIOR AUTHORIZATION (OUTPATIENT)
Dept: NEUROLOGY | Facility: CLINIC | Age: 68
End: 2023-03-28
Payer: MEDICARE

## 2023-05-15 ENCOUNTER — TRANSCRIBE ORDERS (OUTPATIENT)
Dept: ADMINISTRATIVE | Facility: HOSPITAL | Age: 68
End: 2023-05-15
Payer: MEDICARE

## 2023-05-15 DIAGNOSIS — Z12.31 VISIT FOR SCREENING MAMMOGRAM: Primary | ICD-10-CM

## 2023-08-17 ENCOUNTER — TRANSCRIBE ORDERS (OUTPATIENT)
Dept: ADMINISTRATIVE | Facility: HOSPITAL | Age: 68
End: 2023-08-17
Payer: MEDICARE

## 2023-08-17 DIAGNOSIS — R93.0 ABNORMAL HEAD CT: Primary | ICD-10-CM

## 2023-08-25 ENCOUNTER — TRANSCRIBE ORDERS (OUTPATIENT)
Dept: ADMINISTRATIVE | Facility: HOSPITAL | Age: 68
End: 2023-08-25
Payer: MEDICARE

## 2023-08-25 DIAGNOSIS — R93.0 ABNORMAL HEAD CT: Primary | ICD-10-CM

## 2023-08-31 ENCOUNTER — HOSPITAL ENCOUNTER (OUTPATIENT)
Dept: CT IMAGING | Facility: HOSPITAL | Age: 68
Discharge: HOME | End: 2023-08-31
Admitting: INTERNAL MEDICINE
Payer: MEDICARE

## 2023-08-31 DIAGNOSIS — R93.0 ABNORMAL HEAD CT: ICD-10-CM

## 2023-08-31 PROCEDURE — 25510000001 IOPAMIDOL 61 % SOLUTION: Performed by: INTERNAL MEDICINE

## 2023-08-31 PROCEDURE — 70496 CT ANGIOGRAPHY HEAD: CPT

## 2023-08-31 RX ADMIN — IOPAMIDOL 100 ML: 612 INJECTION, SOLUTION INTRAVENOUS at 10:40

## 2023-10-13 ENCOUNTER — OFFICE VISIT (OUTPATIENT)
Dept: NEUROLOGY | Facility: CLINIC | Age: 68
End: 2023-10-13
Payer: MEDICARE

## 2023-10-13 VITALS
SYSTOLIC BLOOD PRESSURE: 122 MMHG | OXYGEN SATURATION: 95 % | DIASTOLIC BLOOD PRESSURE: 78 MMHG | BODY MASS INDEX: 28.34 KG/M2 | WEIGHT: 166 LBS | HEART RATE: 88 BPM | HEIGHT: 64 IN

## 2023-10-13 DIAGNOSIS — G40.209 PARTIAL SYMPTOMATIC EPILEPSY WITH COMPLEX PARTIAL SEIZURES, NOT INTRACTABLE, WITHOUT STATUS EPILEPTICUS: ICD-10-CM

## 2023-10-13 DIAGNOSIS — I65.21 CAROTID ATHEROSCLEROSIS, RIGHT: Primary | ICD-10-CM

## 2023-10-13 PROBLEM — F51.01 INSOMNIA, IDIOPATHIC: Status: ACTIVE | Noted: 2023-10-13

## 2023-10-13 PROBLEM — J45.21 MILD INTERMITTENT ASTHMA WITH (ACUTE) EXACERBATION: Status: ACTIVE | Noted: 2023-10-13

## 2023-10-13 PROBLEM — F41.9 ANXIETY DISORDER: Status: ACTIVE | Noted: 2023-10-13

## 2023-10-13 PROBLEM — G47.33 OBSTRUCTIVE SLEEP APNEA: Status: ACTIVE | Noted: 2023-10-13

## 2023-10-13 PROCEDURE — 3078F DIAST BP <80 MM HG: CPT | Performed by: NURSE PRACTITIONER

## 2023-10-13 PROCEDURE — 99214 OFFICE O/P EST MOD 30 MIN: CPT | Performed by: NURSE PRACTITIONER

## 2023-10-13 PROCEDURE — 1160F RVW MEDS BY RX/DR IN RCRD: CPT | Performed by: NURSE PRACTITIONER

## 2023-10-13 PROCEDURE — 1159F MED LIST DOCD IN RCRD: CPT | Performed by: NURSE PRACTITIONER

## 2023-10-13 PROCEDURE — 3074F SYST BP LT 130 MM HG: CPT | Performed by: NURSE PRACTITIONER

## 2023-10-13 NOTE — LETTER
October 13, 2023     Breanna Washington MD  [address] [facility] [address]  [facility] [address]    Patient: Erin Zavala   YOB: 1955   Date of Visit: 10/13/2023       Dear Breanna Washington MD    Erin Zavaal was in my office today. Below is a copy of my note.    If you have questions, please do not hesitate to call me. I look forward to following Erin along with you.         Sincerely,        LAVELL Gonzalez        CC: No Recipients    Subjective:     Patient ID: Erin Zavala is a 68 y.o. female.    CC:   Chief Complaint   Patient presents with    Seizures       HPI:   History of Present Illness  Today 10/13/2023-    This is a 68-year-old female who presents for 6-month neurology follow-up on partial complex epilepsy present since 2006. On her last visit in the clinic, she had been seizure-free since 2008. We have been slowly lowering her lamotrigine dosing. It appears that on 08/31/2023, she underwent a CTA of the head completed at [facility] for reports of an abnormal CT scan. This was ordered by her primary care provider. Per radiology alone, this shows arterial mural calcification in the right carotid siphon. No other significant stenosis was identified. Incidentally, she did undergo a cone beam CT by her dental specialist, and this did note some concern of calcified atheroma around the [place] of the carter, and this is when that imaging was ordered. I do not see a report of a CTA of the neck. PCP has referred her to Dr. Shivam Laird. I do not see an appointment scheduled at this time.    Today, she reports she is doing well with weaning off lamotrigine. She states she has been off lamotrigine completely since mid 06/2023. She confirms she is still taking aspirin 81 mg daily and Lipitor 20 mg daily. She denies any headaches, weakness, blurred vision, loss of vision, slurred speech, or tingling. She has experienced no seizures since 2008.    She notes  "that her best friend's brother is a neurologist in [place]. He had suggested to her that she should possibly be evaluated by a cardiologist instead of a neurosurgeon due to the plaque in her arteries that could be more widespread.    Prior significant neurology workup and history:  Partial epilepsy that began in 2006. She does have some focus issues. She has had no confusion, staring spells or other issues.  Last seizure in 2008 to her knowledge.      She was on a flight in July 2021 and developed a severe right sided headache with eye pain, felt like needles in her eye, eye watering, lasted 10 minutes of severe pain and then 24 hours of a dull ache. She had some issues with her insurance switching to medicare so she went 2 months without insurance so she didn't see her PCP or explain the symptoms until her regular follow up in November/December 2021. She did have a few more mild episodes since then that caused pain for 10 minutes but no visual changes, no speech changes, no numbness, tingling or weakness with symptoms. Saw PCP and had TSH, CMP, CBC< LDL, FT4 and Sed rate checked and all were normal per provider portal patient showed me during visit today. She did undergo repeat MRI brain w/o contrast 12/15/2021-we reviewed radiology report and imaging previously and this shows chronic small vessel ischemic changes and no acute abnormalities. Agree with radiology report. She is on aspirin, statin, HTN therapy. She did feel \"very strange\" while taking the medicine but it definitely helped. She is unsure of any triggers. History of migraines previously when she has menstrual cycles.      Previously followed Dr. [facility]. Prior spells of confusion confirmed seizures in 2007. She has no history of traumatic brain injury or CNS infection.  She has had a normal brain MRI in 2007 and an EEG at [facility] in 2007 by Dr. Malloy showed left hemisphere slow waves and sharps. Sleep deprived EEG in 2012 was " normal. EEG awake and drowsy completely normal 6/29/19. 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 She reports she has been experiencing episodes of vasovagal syncope since she was a child.      partial complex epilepsy, which began in 2006. Her last seizure, to her knowledge, was in 2008. We have also been treating her for episodic cluster headaches with normal neuroimaging as far as no acute abnormalities, and have prescribed sumatriptan low dose to take as needed.     Has weaned off Lamotrigine slowly and off since June 2023.     X-ray of the lumbar spine was completed on 10/21/2022 showing advanced degenerative disc changes. MRI of the lumbar spine obtained 12/10/2022 showing multiple levels of bulging disc. Options were presented to the patient as physical therapy, interventional pain specialist, or neurosurgery. Patient wanted to go to physical therapy as well as evaluation with pain management, so those orders were placed. She opted not to completed emg/ncvs or see pain management. Symptoms improved. Severe needle phobia.     She believes she was taking her medicine regularly during her EEG in 2019. She has been taking lamotrigine ER since 2006.     The following portions of the patient's history were reviewed and updated as appropriate: allergies, current medications, past family history, past medical history, past social history, past surgical history, and problem list.    Past Medical History:   Diagnosis Date    Carotid atherosclerosis, right 10/13/2023    Diarrhea     Episodic cluster headache, not intractable 01/14/2022    H/O electroencephalogram     History of MRI 2007    brain , normal     Hyperlipidemia     Hypertension     Lumbar radiculopathy, right 3/24/2023    Obstructive sleep apnea 10/13/2023    Seizure     Vomiting        Past Surgical History:   Procedure Laterality Date     CHOLECYSTECTOMY      ERCP AND ENDOSCOPY         Social History     Socioeconomic History    Marital status:    Tobacco Use    Smoking status: Never    Smokeless tobacco: Never   Vaping Use    Vaping Use: Never used   Substance and Sexual Activity    Alcohol use: No     Comment: occasional    Drug use: No    Sexual activity: Not Currently     Birth control/protection: Abstinence, Post-menopausal       Family History   Problem Relation Age of Onset    Alzheimer's disease Mother     Dementia Mother     Breast cancer Mother 62    Cancer Mother     Heart disease Father     Heart failure Father     Hypertension Father     Leukemia Sister     Leukemia Brother     Cancer Maternal Grandmother     No Known Problems Paternal Grandmother     Breast cancer Maternal Aunt 62    Breast cancer Paternal Aunt 42    Stroke Maternal Grandfather 39    Heart disease Paternal Grandfather     Heart failure Paternal Grandfather     Ovarian cancer Neg Hx           Current Outpatient Medications:     acyclovir (ZOVIRAX) 800 MG tablet, Take 1 tablet by mouth., Disp: , Rfl:     alendronate (FOSAMAX) 70 MG tablet, Take 1 tablet by mouth Every 7 (Seven) Days., Disp: , Rfl:     amLODIPine (NORVASC) 10 MG tablet, Take 1 tablet by mouth Daily., Disp: , Rfl:     aspirin 81 MG tablet, Take 1 tablet by mouth Daily., Disp: , Rfl:     atorvastatin (LIPITOR) 20 MG tablet, Take 1 tablet by mouth Daily., Disp: , Rfl:     celecoxib (CeleBREX) 200 MG capsule, , Disp: , Rfl:     hydroCHLOROthiazide (HYDRODIURIL) 25 MG tablet, Take 1 tablet by mouth Daily., Disp: , Rfl:     magnesium oxide (MAG-OX) 400 MG tablet, Take 0.5-1 tablets by mouth Daily., Disp: 90 tablet, Rfl: 3    Nayzilam 5 MG/0.1ML solution, 0.1 mL into the nostril(s) as directed by provider Daily As Needed (at onset of seizure, may repeat once in opposite nostril in 10 minutes if needed)., Disp: 1 mL, Rfl: 0    SUMAtriptan (IMITREX) 25 MG tablet, Take one  "tablet at onset of headache. May repeat dose one time in 2 hours if headache not relieved., Disp: 9 tablet, Rfl: 5     Review of Systems   Musculoskeletal:  Positive for back pain.   Neurological:  Negative for dizziness, tremors, seizures, syncope, facial asymmetry, speech difficulty, weakness, light-headedness, numbness and headaches.   All other systems reviewed and are negative.       Objective:  /78   Pulse 88   Ht 162.6 cm (64\")   Wt 75.3 kg (166 lb)   SpO2 95%   BMI 28.49 kg/mý     Neurologic Exam     Mental Status   Oriented to person, place, and time.   Speech: speech is normal   Level of consciousness: alert    Cranial Nerves   Cranial nerves II through XII intact.     Motor Exam   Muscle bulk: normal  Overall muscle tone: normal    Strength   Strength 5/5 throughout.     Gait, Coordination, and Reflexes     Gait  Gait: normal    Coordination   Finger to nose coordination: normal    Tremor   Resting tremor: absent  Intention tremor: absent  Action tremor: absent    Reflexes   Right : 2+  Left : 2+      Physical Exam  Constitutional:       Appearance: Normal appearance.   Neurological:      Mental Status: She is alert and oriented to person, place, and time.      Cranial Nerves: Cranial nerves 2-12 are intact.      Motor: Motor strength is normal.     Coordination: Finger-Nose-Finger Test normal.      Gait: Gait is intact.   Psychiatric:         Mood and Affect: Mood is anxious (mild related to recent cta head results).         Speech: Speech normal.         Behavior: Behavior normal.         Thought Content: Thought content normal.         Cognition and Memory: Cognition and memory normal.         Judgment: Judgment normal.         Assessment/Plan:       Diagnoses and all orders for this visit:    1. Carotid atherosclerosis, right (Primary)  Comments:  continue aspirin 81mg daily, atorvastatin 20mg for now  Orders:  -     CT Angiogram Neck; Future    2. Partial symptomatic epilepsy with " complex partial seizures, not intractable, without status epilepticus  Comments:  off lamotrigine completely with no recurrent spells, last seizure in 2008, continue to monitor         Regarding her seizures, she has been off the medication. She has had no issues at all. The last seizure was in 2008. We will continue to monitor her off an antiepileptic. She has been traveling. She has been interested in scheduling with neurosurgery, Dr. Langley Given- on the referral note it does state that she would be managed medically at this current time. She is on aspirin 81 mg daily and Lipitor 20 mg. I have recommended moving forward with a CTA of the neck to see if we can further visualize the right carotid siphon stenosis to see the extent of this. Once this is completed, we will contact her and if there is moderate stenosis or greater, we will have her get scheduled with neurosurgery for consultation. Could consider increased lipitor to 40-80mg in future based on those findings. Continue Aspirin 81mg daily.    She will follow up in 6 to 7 months. She will call us with any questions or concerns prior to follow up in clinic. She verbalized understanding and agreed with plan today.    Reviewed medications, potential side effects and signs and symptoms to report. Discussed risk versus benefits of treatment plan with patient and/or family-including medications, labs and radiology that may be ordered. Addressed questions and concerns during visit. Patient and/or family verbalized understanding and agree with plan.    During this visit the following were done:  Labs Reviewed []    Labs Ordered []    Radiology Reports Reviewed [x]    Radiology Ordered [x]    PCP Records Reviewed []    Referring Provider Records Reviewed []    ER Records Reviewed []    Hospital Records Reviewed []    History Obtained From Family []    Radiology Images Reviewed [x]    Other Reviewed []    Records Requested []      Note to patient: The 21st Century  Cures Act makes medical notes like these available to patients in the interest of transparency. However, be advised this is a medical document. It is intended as peer to peer communication. It is written in medical language and may contain abbreviations or verbiage that are unfamiliar. It may appear blunt or direct. Medical documents are intended to carry relevant information, facts as evident, and the clinical opinion of the provider.      Transcribed from ambient dictation for LAVELL Gonzalez by Sugar Portillo.  10/13/23   12:46 EDT    Patient or patient representative verbalized consent to the visit recording.  I have personally performed the services described in this document as transcribed by the above individual, and it is both accurate and complete.  LAVELL Gonzalez  10/13/2023  13:16 EDT

## 2023-10-13 NOTE — PROGRESS NOTES
Subjective:     Patient ID: Erin Zavala is a 68 y.o. female.    CC:   Chief Complaint   Patient presents with    Seizures       HPI:   History of Present Illness  Today 10/13/2023-    This is a 68-year-old female who presents for 6-month neurology follow-up on partial complex epilepsy present since 2006. On her last visit in the clinic, she had been seizure-free since 2008. We have been slowly lowering her lamotrigine dosing. It appears that on 08/31/2023, she underwent a CTA of the head completed at [facility] for reports of an abnormal CT scan. This was ordered by her primary care provider. Per radiology alone, this shows arterial mural calcification in the right carotid siphon. No other significant stenosis was identified. Incidentally, she did undergo a cone beam CT by her dental specialist, and this did note some concern of calcified atheroma around the [place] of the carter, and this is when that imaging was ordered. I do not see a report of a CTA of the neck. PCP has referred her to Dr. Shivam Laird. I do not see an appointment scheduled at this time.    Today, she reports she is doing well with weaning off lamotrigine. She states she has been off lamotrigine completely since mid 06/2023. She confirms she is still taking aspirin 81 mg daily and Lipitor 20 mg daily. She denies any headaches, weakness, blurred vision, loss of vision, slurred speech, or tingling. She has experienced no seizures since 2008.    She notes that her best friend's brother is a neurologist in [place]. He had suggested to her that she should possibly be evaluated by a cardiologist instead of a neurosurgeon due to the plaque in her arteries that could be more widespread.    Prior significant neurology workup and history:  Partial epilepsy that began in 2006. She does have some focus issues. She has had no confusion, staring spells or other issues.  Last seizure in 2008 to her knowledge.      She was on a flight in July  "2021 and developed a severe right sided headache with eye pain, felt like needles in her eye, eye watering, lasted 10 minutes of severe pain and then 24 hours of a dull ache. She had some issues with her insurance switching to medicare so she went 2 months without insurance so she didn't see her PCP or explain the symptoms until her regular follow up in November/December 2021. She did have a few more mild episodes since then that caused pain for 10 minutes but no visual changes, no speech changes, no numbness, tingling or weakness with symptoms. Saw PCP and had TSH, CMP, CBC< LDL, FT4 and Sed rate checked and all were normal per provider portal patient showed me during visit today. She did undergo repeat MRI brain w/o contrast 12/15/2021-we reviewed radiology report and imaging previously and this shows chronic small vessel ischemic changes and no acute abnormalities. Agree with radiology report. She is on aspirin, statin, HTN therapy. She did feel \"very strange\" while taking the medicine but it definitely helped. She is unsure of any triggers. History of migraines previously when she has menstrual cycles.      Previously followed Dr. Maldon[facility]. Prior spells of confusion confirmed seizures in 2007. She has no history of traumatic brain injury or CNS infection.  She has had a normal brain MRI in 2007 and an EEG at [facility] in 2007 by Dr. Malloy showed left hemisphere slow waves and sharps. Sleep deprived EEG in 2012 was normal. EEG awake and drowsy completely normal 6/29/19. 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 She reports she has been experiencing episodes of vasovagal syncope since she was a child.      partial complex epilepsy, which began in 2006. Her last seizure, to her knowledge, was in 2008. We have also been treating her for episodic cluster headaches with normal neuroimaging as far " as no acute abnormalities, and have prescribed sumatriptan low dose to take as needed.     Has weaned off Lamotrigine slowly and off since June 2023.     X-ray of the lumbar spine was completed on 10/21/2022 showing advanced degenerative disc changes. MRI of the lumbar spine obtained 12/10/2022 showing multiple levels of bulging disc. Options were presented to the patient as physical therapy, interventional pain specialist, or neurosurgery. Patient wanted to go to physical therapy as well as evaluation with pain management, so those orders were placed. She opted not to completed emg/ncvs or see pain management. Symptoms improved. Severe needle phobia.     She believes she was taking her medicine regularly during her EEG in 2019. She has been taking lamotrigine ER since 2006.     The following portions of the patient's history were reviewed and updated as appropriate: allergies, current medications, past family history, past medical history, past social history, past surgical history, and problem list.    Past Medical History:   Diagnosis Date    Carotid atherosclerosis, right 10/13/2023    Diarrhea     Episodic cluster headache, not intractable 01/14/2022    H/O electroencephalogram     History of MRI 2007    brain , normal     Hyperlipidemia     Hypertension     Lumbar radiculopathy, right 3/24/2023    Obstructive sleep apnea 10/13/2023    Seizure     Vomiting        Past Surgical History:   Procedure Laterality Date    CHOLECYSTECTOMY      ERCP AND ENDOSCOPY         Social History     Socioeconomic History    Marital status:    Tobacco Use    Smoking status: Never    Smokeless tobacco: Never   Vaping Use    Vaping Use: Never used   Substance and Sexual Activity    Alcohol use: No     Comment: occasional    Drug use: No    Sexual activity: Not Currently     Birth control/protection: Abstinence, Post-menopausal       Family History   Problem Relation Age of Onset    Alzheimer's disease Mother     Dementia  "Mother     Breast cancer Mother 62    Cancer Mother     Heart disease Father     Heart failure Father     Hypertension Father     Leukemia Sister     Leukemia Brother     Cancer Maternal Grandmother     No Known Problems Paternal Grandmother     Breast cancer Maternal Aunt 62    Breast cancer Paternal Aunt 42    Stroke Maternal Grandfather 39    Heart disease Paternal Grandfather     Heart failure Paternal Grandfather     Ovarian cancer Neg Hx           Current Outpatient Medications:     acyclovir (ZOVIRAX) 800 MG tablet, Take 1 tablet by mouth., Disp: , Rfl:     alendronate (FOSAMAX) 70 MG tablet, Take 1 tablet by mouth Every 7 (Seven) Days., Disp: , Rfl:     amLODIPine (NORVASC) 10 MG tablet, Take 1 tablet by mouth Daily., Disp: , Rfl:     aspirin 81 MG tablet, Take 1 tablet by mouth Daily., Disp: , Rfl:     atorvastatin (LIPITOR) 20 MG tablet, Take 1 tablet by mouth Daily., Disp: , Rfl:     celecoxib (CeleBREX) 200 MG capsule, , Disp: , Rfl:     hydroCHLOROthiazide (HYDRODIURIL) 25 MG tablet, Take 1 tablet by mouth Daily., Disp: , Rfl:     magnesium oxide (MAG-OX) 400 MG tablet, Take 0.5-1 tablets by mouth Daily., Disp: 90 tablet, Rfl: 3    Nayzilam 5 MG/0.1ML solution, 0.1 mL into the nostril(s) as directed by provider Daily As Needed (at onset of seizure, may repeat once in opposite nostril in 10 minutes if needed)., Disp: 1 mL, Rfl: 0    SUMAtriptan (IMITREX) 25 MG tablet, Take one tablet at onset of headache. May repeat dose one time in 2 hours if headache not relieved., Disp: 9 tablet, Rfl: 5     Review of Systems   Musculoskeletal:  Positive for back pain.   Neurological:  Negative for dizziness, tremors, seizures, syncope, facial asymmetry, speech difficulty, weakness, light-headedness, numbness and headaches.   All other systems reviewed and are negative.       Objective:  /78   Pulse 88   Ht 162.6 cm (64\")   Wt 75.3 kg (166 lb)   SpO2 95%   BMI 28.49 kg/mý     Neurologic Exam     Mental " Status   Oriented to person, place, and time.   Speech: speech is normal   Level of consciousness: alert    Cranial Nerves   Cranial nerves II through XII intact.     Motor Exam   Muscle bulk: normal  Overall muscle tone: normal    Strength   Strength 5/5 throughout.     Gait, Coordination, and Reflexes     Gait  Gait: normal    Coordination   Finger to nose coordination: normal    Tremor   Resting tremor: absent  Intention tremor: absent  Action tremor: absent    Reflexes   Right : 2+  Left : 2+      Physical Exam  Constitutional:       Appearance: Normal appearance.   Neurological:      Mental Status: She is alert and oriented to person, place, and time.      Cranial Nerves: Cranial nerves 2-12 are intact.      Motor: Motor strength is normal.     Coordination: Finger-Nose-Finger Test normal.      Gait: Gait is intact.   Psychiatric:         Mood and Affect: Mood is anxious (mild related to recent cta head results).         Speech: Speech normal.         Behavior: Behavior normal.         Thought Content: Thought content normal.         Cognition and Memory: Cognition and memory normal.         Judgment: Judgment normal.         Assessment/Plan:       Diagnoses and all orders for this visit:    1. Carotid atherosclerosis, right (Primary)  Comments:  continue aspirin 81mg daily, atorvastatin 20mg for now  Orders:  -     CT Angiogram Neck; Future    2. Partial symptomatic epilepsy with complex partial seizures, not intractable, without status epilepticus  Comments:  off lamotrigine completely with no recurrent spells, last seizure in 2008, continue to monitor         Regarding her seizures, she has been off the medication. She has had no issues at all. The last seizure was in 2008. We will continue to monitor her off an antiepileptic. She has been traveling. She has been interested in scheduling with neurosurgery, Dr. Langley Given- on the referral note it does state that she would be managed medically at  this current time. She is on aspirin 81 mg daily and Lipitor 20 mg. I have recommended moving forward with a CTA of the neck to see if we can further visualize the right carotid siphon stenosis to see the extent of this. Once this is completed, we will contact her and if there is moderate stenosis or greater, we will have her get scheduled with neurosurgery for consultation. Could consider increased lipitor to 40-80mg in future based on those findings. Continue Aspirin 81mg daily.    She will follow up in 6 to 7 months. She will call us with any questions or concerns prior to follow up in clinic. She verbalized understanding and agreed with plan today.    Reviewed medications, potential side effects and signs and symptoms to report. Discussed risk versus benefits of treatment plan with patient and/or family-including medications, labs and radiology that may be ordered. Addressed questions and concerns during visit. Patient and/or family verbalized understanding and agree with plan.    During this visit the following were done:  Labs Reviewed []    Labs Ordered []    Radiology Reports Reviewed [x]    Radiology Ordered [x]    PCP Records Reviewed []    Referring Provider Records Reviewed []    ER Records Reviewed []    Hospital Records Reviewed []    History Obtained From Family []    Radiology Images Reviewed [x]    Other Reviewed []    Records Requested []      Note to patient: The 21st Century Cures Act makes medical notes like these available to patients in the interest of transparency. However, be advised this is a medical document. It is intended as peer to peer communication. It is written in medical language and may contain abbreviations or verbiage that are unfamiliar. It may appear blunt or direct. Medical documents are intended to carry relevant information, facts as evident, and the clinical opinion of the provider.      Transcribed from ambient dictation for LAVELL Gonzalez by Sugar  Bandar.  10/13/23   12:46 EDT    Patient or patient representative verbalized consent to the visit recording.  I have personally performed the services described in this document as transcribed by the above individual, and it is both accurate and complete.  Cristy Bowen, APRN  10/13/2023  13:16 EDT

## 2023-10-26 ENCOUNTER — HOSPITAL ENCOUNTER (OUTPATIENT)
Dept: CT IMAGING | Facility: HOSPITAL | Age: 68
Discharge: HOME | End: 2023-10-26
Admitting: NURSE PRACTITIONER
Payer: MEDICARE

## 2023-10-26 DIAGNOSIS — I65.21 CAROTID ATHEROSCLEROSIS, RIGHT: ICD-10-CM

## 2023-10-26 PROCEDURE — 25510000001 IOPAMIDOL 61 % SOLUTION: Performed by: NURSE PRACTITIONER

## 2023-10-26 PROCEDURE — 70498 CT ANGIOGRAPHY NECK: CPT

## 2023-10-26 RX ADMIN — IOPAMIDOL 100 ML: 612 INJECTION, SOLUTION INTRAVENOUS at 17:28

## 2023-10-27 NOTE — PROGRESS NOTES
Please let Erin know the CTA of her neck shows no abnormalities, no plaque, stenosis, aneurysms or blockages. This is a normal study. Please fax a copy of this to PCP. No changes recommended at this time. We will follow up as scheduled in office. Thanks, LAVELL Flores

## 2023-10-30 ENCOUNTER — TELEPHONE (OUTPATIENT)
Dept: NEUROLOGY | Facility: CLINIC | Age: 68
End: 2023-10-30
Payer: MEDICARE

## 2023-10-30 NOTE — TELEPHONE ENCOUNTER
----- Message from LAVELL Gonzalez sent at 10/27/2023  2:43 PM EDT -----  Please let Erin know the CTA of her neck shows no abnormalities, no plaque, stenosis, aneurysms or blockages. This is a normal study. Please fax a copy of this to PCP. No changes recommended at this time. We will follow up as scheduled in office. Thanks, LAVELL Flores

## 2024-01-22 ENCOUNTER — HOSPITAL ENCOUNTER (OUTPATIENT)
Dept: MAMMOGRAPHY | Facility: HOSPITAL | Age: 69
Discharge: HOME | End: 2024-01-22
Admitting: INTERNAL MEDICINE
Payer: MEDICARE

## 2024-01-22 DIAGNOSIS — Z12.31 VISIT FOR SCREENING MAMMOGRAM: ICD-10-CM

## 2024-01-22 PROCEDURE — 77067 SCR MAMMO BI INCL CAD: CPT

## 2024-01-22 PROCEDURE — 77063 BREAST TOMOSYNTHESIS BI: CPT

## 2024-01-23 PROCEDURE — 77063 BREAST TOMOSYNTHESIS BI: CPT | Performed by: RADIOLOGY

## 2024-01-23 PROCEDURE — 77067 SCR MAMMO BI INCL CAD: CPT | Performed by: RADIOLOGY

## 2024-02-05 ENCOUNTER — TELEPHONE (OUTPATIENT)
Dept: NEUROLOGY | Facility: CLINIC | Age: 69
End: 2024-02-05
Payer: MEDICARE

## 2024-02-05 NOTE — TELEPHONE ENCOUNTER
Called patient, kinsey, to reschedule her appt on 4/8/24 with WALI Bowen.  Please transfer to office.

## 2024-02-06 NOTE — TELEPHONE ENCOUNTER
Called patient again, kinsey, also scheduled the next day at the same time (11:00 am), and sent patient an appt.

## 2024-06-10 ENCOUNTER — TRANSCRIBE ORDERS (OUTPATIENT)
Dept: ADMINISTRATIVE | Facility: HOSPITAL | Age: 69
End: 2024-06-10
Payer: MEDICARE

## 2024-06-10 DIAGNOSIS — Z12.31 SCREENING MAMMOGRAM FOR BREAST CANCER: Primary | ICD-10-CM

## 2024-07-02 ENCOUNTER — HOSPITAL ENCOUNTER (OUTPATIENT)
Dept: GENERAL RADIOLOGY | Facility: HOSPITAL | Age: 69
Discharge: HOME | End: 2024-07-02
Admitting: INTERNAL MEDICINE
Payer: MEDICARE

## 2024-07-02 ENCOUNTER — TRANSCRIBE ORDERS (OUTPATIENT)
Dept: ADMINISTRATIVE | Facility: HOSPITAL | Age: 69
End: 2024-07-02
Payer: MEDICARE

## 2024-07-02 DIAGNOSIS — M54.16 LUMBAR RADICULOPATHY: Primary | ICD-10-CM

## 2024-07-02 DIAGNOSIS — M54.16 LUMBAR RADICULOPATHY: ICD-10-CM

## 2024-07-02 PROCEDURE — 72072 X-RAY EXAM THORAC SPINE 3VWS: CPT

## 2024-07-02 PROCEDURE — 72110 X-RAY EXAM L-2 SPINE 4/>VWS: CPT

## 2024-08-02 ENCOUNTER — TELEPHONE (OUTPATIENT)
Dept: NEUROLOGY | Facility: CLINIC | Age: 69
End: 2024-08-02
Payer: MEDICARE

## 2024-08-02 DIAGNOSIS — M54.17 LUMBOSACRAL RADICULOPATHY: Primary | ICD-10-CM

## 2024-08-02 NOTE — TELEPHONE ENCOUNTER
Caller: Erin Zavala    Relationship to patient: Self    Best call back number: [phone]    Chief complaint: STATED THE PAIN IN HER BACK IS NOT GETTING BETTER.   STATED SHE HAS BEEN DOING THE PHYSICAL THERAPY FOR A COUPLE OF MONTHS AND SHE IS NOT NOTICING MUCH DIFFERENCE.   STATED SHE WOULD LIKE TO SEE THE PROVIDER SOONER APPT OR IF OTHER TEST ARE NEEDED.   STATED SHE IS HAVING A LOT OF PAIN IN HER RIGHT LEG, THAT GOES FROM HIP TO FOOT.  STATED SHE DID HAVE A BACK X-RAY A FEW MONTHS AGO & IS IN MY CHART.    Type of visit: FOLLOW UP    Requested date: SOONER THAN HER 9-30-24     If rescheduling, when is the original appointment:  9-30-24    Additional notes: TRIED TO R/S FOR A SOONER APPT, NOTHING IS AVAILABLE.  DOES CLINIC HAVE ANYTHING SOONER?      PLEASE CALL & ADVISE

## 2024-08-07 NOTE — TELEPHONE ENCOUNTER
Spoke with patient and advised about MRI.  Patient's appt with Cristy Bowen is 09/30 and patient is gong to wait on rescheduling appt until MRI is scheduled just in case MRI is before appt date.  Patient will call back with any concerns or questions.

## 2024-08-19 ENCOUNTER — HOSPITAL ENCOUNTER (OUTPATIENT)
Facility: HOSPITAL | Age: 69
Discharge: HOME | End: 2024-08-19
Admitting: PSYCHIATRY & NEUROLOGY
Payer: MEDICARE

## 2024-08-19 DIAGNOSIS — M54.17 LUMBOSACRAL RADICULOPATHY: ICD-10-CM

## 2024-08-19 PROCEDURE — 72148 MRI LUMBAR SPINE W/O DYE: CPT

## 2024-08-23 NOTE — PROGRESS NOTES
Please notify Erin that the MRI of her lumbar spine shows persistent findings of bulging discs and arthritis changes. When compared to 12/10/2022 imaging, these findings are overall stable with no big changes present. There is some narrowing of the areas around the low back nerves as well. Physical therapy has not bee helpful. Would she be willing to see an interventional pain specialist to discuss injections and other treatment options? If so, I can place a referral.  Let me know. Thanks, LAVELL Flores    Fax a copy of MRI L spine report with my remarks to PCP

## 2024-08-26 ENCOUNTER — TELEPHONE (OUTPATIENT)
Dept: NEUROLOGY | Facility: CLINIC | Age: 69
End: 2024-08-26
Payer: MEDICARE

## 2024-08-26 DIAGNOSIS — M51.36 DDD (DEGENERATIVE DISC DISEASE), LUMBAR: Primary | ICD-10-CM

## 2024-08-26 DIAGNOSIS — M54.16 LUMBAR RADICULOPATHY, RIGHT: ICD-10-CM

## 2024-08-26 NOTE — TELEPHONE ENCOUNTER
Pt aware of the results and would like to try Pain management, she said she is still doing PT but no real improvement after 2 1/2 months.

## 2024-08-26 NOTE — TELEPHONE ENCOUNTER
----- Message from Cristy Bowen sent at 8/23/2024 10:55 AM EDT -----  Please notify Erin that the MRI of her lumbar spine shows persistent findings of bulging discs and arthritis changes. When compared to 12/10/2022 imaging, these findings are overall stable with no big changes present. There is some narrowing of the areas around the low back nerves as well. Physical therapy has not bee helpful. Would she be willing to see an interventional pain specialist to discuss injections and other treatment options? If so, I can place a referral.  Let me know. Thanks, LAVELL Flores    Fax a copy of MRI L spine report with my remarks to PCP

## 2024-09-05 ENCOUNTER — OFFICE VISIT (OUTPATIENT)
Dept: NEUROLOGY | Facility: CLINIC | Age: 69
End: 2024-09-05
Payer: MEDICARE

## 2024-09-05 VITALS
OXYGEN SATURATION: 92 % | HEIGHT: 63 IN | DIASTOLIC BLOOD PRESSURE: 80 MMHG | BODY MASS INDEX: 26.58 KG/M2 | SYSTOLIC BLOOD PRESSURE: 130 MMHG | WEIGHT: 150 LBS | HEART RATE: 97 BPM

## 2024-09-05 DIAGNOSIS — M54.16 LUMBAR RADICULOPATHY, RIGHT: ICD-10-CM

## 2024-09-05 DIAGNOSIS — G40.209 PARTIAL SYMPTOMATIC EPILEPSY WITH COMPLEX PARTIAL SEIZURES, NOT INTRACTABLE, WITHOUT STATUS EPILEPTICUS: Primary | ICD-10-CM

## 2024-09-05 DIAGNOSIS — M43.06 LUMBAR SPONDYLOLYSIS: ICD-10-CM

## 2024-09-05 DIAGNOSIS — G44.019 EPISODIC CLUSTER HEADACHE, NOT INTRACTABLE: ICD-10-CM

## 2024-09-05 PROBLEM — F43.10 PTSD (POST-TRAUMATIC STRESS DISORDER): Status: ACTIVE | Noted: 2024-09-05

## 2024-09-05 PROBLEM — M81.8 OSTEOPOROSIS, IDIOPATHIC: Status: ACTIVE | Noted: 2024-09-05

## 2024-09-05 PROBLEM — G40.909 SEIZURE DISORDER: Status: ACTIVE | Noted: 2024-09-05

## 2024-09-05 RX ORDER — TIZANIDINE 2 MG/1
2-4 TABLET ORAL NIGHTLY PRN
Qty: 60 TABLET | Refills: 1 | Status: SHIPPED | OUTPATIENT
Start: 2024-09-05

## 2024-09-05 RX ORDER — SUMATRIPTAN 25 MG/1
TABLET, FILM COATED ORAL
Qty: 9 TABLET | Refills: 5 | Status: SHIPPED | OUTPATIENT
Start: 2024-09-05

## 2024-09-05 NOTE — PROGRESS NOTES
Subjective:     Patient ID: Erin Zavala is a 69 y.o. female.    CC:   Chief Complaint   Patient presents with    Seizures    Back Pain       HPI:   History of Present Illness  This is a pleasant 69-year-old female presenting for an 11-month follow-up on known right carotid atherosclerosis and complex partial seizures, which have been present since 2006. She has had prior cluster headaches which have been rare. She is here for follow-up and reevaluation of symptoms today.    She has been seizure-free since 2008 and was able to wean off lamotrigine completely with no recurrent seizure spells. This has been monitored closely. She has Nayzilam rescue nasal spray if needed. She has never had to use this.    She is currently on aspirin 81 mg daily and Lipitor 20 mg daily for the management of her carotid atherosclerosis.    She recently contacted our clinic in 08/2024 reporting a flare up in her chronic low back pain with pain radiating into her right leg, hip, and foot. An MRI of the lumbar spine was performed on 08/19/2024, ordered by my collaborating neurology physician. She was referred to pain management on 08/26/2024 for a possible injection and is scheduled to see Dr. Gen Bailon on 09/18/2024 for further evaluation. Physical therapy has not been helpful for her low back pain.    She experienced significant improvement in her condition following physical therapy last year. However, after mina COVID-19 during a trip in 05/2024, she began experiencing pain in her right leg a few days later. Despite resuming physical therapy for the past 3 months, her overall conditioning has improved but the pain persists in her low back and radiating into her right leg. She reports no swelling, redness, or rash. The pain seems to originate from her lower back and intensifies as the day progresses, particularly when walking. By evening, she experiences severe pain and difficulty climbing stairs. Occasionally,  she also feels pain in her left leg. She is highly motivated to seek treatment as the pain is significantly impacting her quality of life. She has noticed some weakness in her legs, particularly towards the end of the day, and occasionally experiences severe burning pain. Her pain improves when sitting, especially on a hard chair, but worsens when standing still. She manages her pain with Celebrex and Tylenol. She was prescribed cyclobenzaprine by Dr. Washington, but it caused stomach upset, so she discontinued it after four doses. She had a fall over a year ago where she landed on her left side. She does not use any mobility aids and reports no loss of sensation in her feet.     She takes Ambien as needed for insomnia.      She reports no headaches, including ice pick headaches, and uses sumatriptan as needed.    She has osteoporosis and is scheduled for a bone scan next month with Dr. Washington.     She has lost a considerable amount of weight unintentionally and has a decreased appetite. She reports no reflux. She had a colonoscopy 2 to 3 years ago. Her primary care physician informed her that her calcium levels were high, but she was not overly concerned and planned to recheck it at the next visit. Her sister recently underwent a parathyroidectomy due to high calcium levels in her blood. She is scheduled to see her PCP October 2024 with labs.    Prior significant neurology workup and history:  Partial epilepsy that began in 2006. She does have some focus issues. She has had no confusion, staring spells or other issues.  Last seizure in 2008 to her knowledge.      She was on a flight in July 2021 and developed a severe right sided headache with eye pain, felt like needles in her eye, eye watering, lasted 10 minutes of severe pain and then 24 hours of a dull ache. She had some issues with her insurance switching to medicare so she went 2 months without insurance so she didn't see her PCP or explain the symptoms until her  "regular follow up in November/December 2021. She did have a few more mild episodes since then that caused pain for 10 minutes but no visual changes, no speech changes, no numbness, tingling or weakness with symptoms. Saw PCP and had TSH, CMP, CBC< LDL, FT4 and Sed rate checked and all were normal per provider portal patient showed me during visit today. She did undergo repeat MRI brain w/o contrast 12/15/2021-we reviewed radiology report and imaging previously and this shows chronic small vessel ischemic changes and no acute abnormalities. Agree with radiology report. She is on aspirin, statin, HTN therapy. She did feel \"very strange\" while taking the medicine but it definitely helped. She is unsure of any triggers. History of migraines previously when she has menstrual cycles.      Previously followed Dr. [facility]. Prior spells of confusion confirmed seizures in 2007. She has no history of traumatic brain injury or CNS infection.  She has had a normal brain MRI in 2007 and an EEG at [facility] in 2007 by Dr. Malloy showed left hemisphere slow waves and sharps. Sleep deprived EEG in 2012 was normal. EEG awake and drowsy completely normal 6/29/19. 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 She reports she has been experiencing episodes of vasovagal syncope since she was a child.      Partial complex epilepsy, which began in 2006. Her last seizure, to her knowledge, was in 2008. We have also been treating her for episodic cluster headaches with normal neuroimaging as far as no acute abnormalities, and have prescribed sumatriptan low dose to take as needed.      X-ray of the lumbar spine was completed on 10/21/2022 showing advanced degenerative disc changes. MRI of the lumbar spine obtained 12/10/2022 showing multiple levels of bulging disc. Options were presented to the patient as physical therapy, " interventional pain specialist, or neurosurgery. Patient wanted to go to physical therapy as well as evaluation with pain management, so those orders were placed. She opted not to completed emg/ncvs or see pain management. Symptoms improved. Severe needle phobia.     She believes she was taking her medicine regularly during her EEG in 2019. She has been taking lamotrigine ER since 2006. Has weaned off Lamotrigine slowly and off since June 2023.    It appears that on 08/31/2023, she underwent a CTA of the head completed at [facility] for reports of an abnormal CT scan. This was ordered by her primary care provider. Per radiology alone, this shows arterial mural calcification in the right carotid siphon. No other significant stenosis was identified. Incidentally, she did undergo a cone beam CT by her dental specialist, and this did note some concern of calcified atheroma around the [place] of the carter, and this is when that imaging was ordered. She was referred to [facility] Dr. Shivam Laird and was not evaluated, but recommended medication management.     The following portions of the patient's history were reviewed and updated as appropriate: allergies, current medications, past family history, past medical history, past social history, past surgical history, and problem list.    Past Medical History:   Diagnosis Date    Carotid atherosclerosis, right 10/13/2023    Diarrhea     Episodic cluster headache, not intractable 01/14/2022    H/O electroencephalogram     History of MRI 2007    brain , normal     Hyperlipidemia     Hypertension     Lumbar radiculopathy, right 03/24/2023    Obstructive sleep apnea 10/13/2023    Seizure     Vomiting        Past Surgical History:   Procedure Laterality Date    CHOLECYSTECTOMY      ERCP AND ENDOSCOPY         Social History     Socioeconomic History    Marital status:    Tobacco Use    Smoking status: Never    Smokeless tobacco: Never   Vaping Use    Vaping  status: Never Used   Substance and Sexual Activity    Alcohol use: No     Comment: occasional    Drug use: No    Sexual activity: Not Currently     Birth control/protection: Abstinence, Post-menopausal       Family History   Problem Relation Age of Onset    Alzheimer's disease Mother     Dementia Mother     Breast cancer Mother 62    Cancer Mother     Heart disease Father     Heart failure Father     Hypertension Father     Leukemia Sister     Leukemia Brother     Cancer Maternal Grandmother     No Known Problems Paternal Grandmother     Breast cancer Maternal Aunt 62    Breast cancer Paternal Aunt 42    Stroke Maternal Grandfather 39    Heart disease Paternal Grandfather     Heart failure Paternal Grandfather     Ovarian cancer Neg Hx           Current Outpatient Medications:     acyclovir (ZOVIRAX) 800 MG tablet, Take 1 tablet by mouth., Disp: , Rfl:     alendronate (FOSAMAX) 70 MG tablet, Take 1 tablet by mouth Every 7 (Seven) Days., Disp: , Rfl:     amLODIPine (NORVASC) 10 MG tablet, Take 1 tablet by mouth Daily., Disp: , Rfl:     aspirin 81 MG tablet, Take 1 tablet by mouth Daily., Disp: , Rfl:     atorvastatin (LIPITOR) 20 MG tablet, Take 1 tablet by mouth Daily., Disp: , Rfl:     celecoxib (CeleBREX) 200 MG capsule, , Disp: , Rfl:     hydroCHLOROthiazide (HYDRODIURIL) 25 MG tablet, Take 1 tablet by mouth Daily., Disp: , Rfl:     Nayzilam 5 MG/0.1ML solution, 0.1 mL into the nostril(s) as directed by provider Daily As Needed (at onset of seizure, may repeat once in opposite nostril in 10 minutes if needed)., Disp: 1 mL, Rfl: 0    SUMAtriptan (IMITREX) 25 MG tablet, Take one tablet at onset of headache. May repeat dose one time in 2 hours if headache not relieved., Disp: 9 tablet, Rfl: 5    tiZANidine (ZANAFLEX) 2 MG tablet, Take 1-2 tablets by mouth At Night As Needed for Muscle Spasms., Disp: 60 tablet, Rfl: 1     Review of Systems   Constitutional:  Positive for activity change.   Musculoskeletal:  Positive  "for back pain and gait problem.   Neurological:  Positive for weakness and numbness. Negative for headaches.   Psychiatric/Behavioral:  Positive for sleep disturbance. The patient is nervous/anxious.    All other systems reviewed and are negative.       Objective:  /80   Pulse 97   Ht 160 cm (63\")   Wt 68 kg (150 lb)   SpO2 92%   BMI 26.57 kg/m²     Neurological Exam  Mental Status  Alert. Oriented to person, place, time and situation. Memory is normal. Recent and remote memory are intact. Speech is normal. Language is fluent with no aphasia. Attention and concentration are normal. Fund of knowledge is appropriate for level of education.    Cranial Nerves  CN II: Visual acuity is normal. Visual fields full to confrontation.  CN III, IV, VI: Extraocular movements intact bilaterally. Normal lids and orbits bilaterally. Pupils equal round and reactive to light bilaterally.  CN V: Facial sensation is normal.  CN VII: Full and symmetric facial movement.  CN IX, X: Palate elevates symmetrically. Normal gag reflex.  CN XI: Shoulder shrug strength is normal.  CN XII: Tongue midline without atrophy or fasciculations.    Motor  Normal muscle bulk throughout. No fasciculations present. Normal muscle tone. No abnormal involuntary movements. Strength is 5/5 throughout all four extremities.    Sensory  Light touch abnormality: Pinprick abnormality: Temperature abnormality: Vibration abnormality: Proprioception is normal in upper and lower extremities.     All significantly decreased sensation RLE, Normal LLE.    Reflexes  Right Plantar: downgoing  Left Plantar: downgoing    Right pathological reflexes: Alexander's absent. Ankle clonus absent.  Left pathological reflexes: Alexander's absent. Ankle clonus absent.    Coordination    Finger-to-nose, rapid alternating movements and heel-to-shin normal bilaterally without dysmetria.    Gait  Casual gait: Wide stance. Normal stride length. Antalgic gait. Mild antalgia. Normal " right arm swing. Normal left arm swing.Normal toe walking. Normal heel walking. Normal tandem gait. Romberg is absent. Able to rise from chair without using arms.    Physical Exam  Constitutional:       Appearance: Normal appearance.   Eyes:      General: Lids are normal.      Extraocular Movements: Extraocular movements intact.      Pupils: Pupils are equal, round, and reactive to light.   Musculoskeletal:      Lumbar back: Spasms and tenderness (right side) present. No swelling, edema, deformity, signs of trauma, lacerations or bony tenderness. Decreased range of motion. Positive right straight leg raise test. No scoliosis.   Neurological:      Mental Status: She is alert.      Motor: Motor strength is normal.     Coordination: Coordination is intact. Romberg sign negative.   Psychiatric:         Mood and Affect: Mood is anxious.         Speech: Speech normal.         Behavior: Behavior normal.         Thought Content: Thought content normal.         Cognition and Memory: Cognition and memory normal.         Judgment: Judgment normal.       Results:  Results  Laboratory Studies  Thyroid labs normal in August 2023 with PCP.    Imaging  X-ray of the lumbar spine showed multilevel thoracic and lumbar spondylosis which was moderate 7/2024 ordered by PCP. MRI of the lumbar spine shows multiple levels of bulging disks and arthritis, neuroforaminal and central spinal stenosis 8/19/2024-relatively stable to slightly advanced compared to 2022 imaging. CTA of the neck completed 10/26/2023 showing no evidence of carotid stenosis.    Assessment/Plan:     Diagnoses and all orders for this visit:    1. Partial symptomatic epilepsy with complex partial seizures, not intractable, without status epilepticus (Primary)  Comments:  off AEDs since 2023, no seizures since 2008, continue to monitor    2. Lumbar radiculopathy, right  Comments:  pending pain management consult with Dr. Bailon 9/18/2024  Orders:  -     tiZANidine  (ZANAFLEX) 2 MG tablet; Take 1-2 tablets by mouth At Night As Needed for Muscle Spasms.  Dispense: 60 tablet; Refill: 1    3. Lumbar spondylolysis  Comments:  pending pain management consult with Dr. Bailon 9/18/2024  Orders:  -     tiZANidine (ZANAFLEX) 2 MG tablet; Take 1-2 tablets by mouth At Night As Needed for Muscle Spasms.  Dispense: 60 tablet; Refill: 1    4. Episodic cluster headache, not intractable  Comments:  MRI BRAIN 12/15/2021-reviewed-chronic small vessel changes, no acute abnormalities, cont aspirin/statin  Orders:  -     SUMAtriptan (IMITREX) 25 MG tablet; Take one tablet at onset of headache. May repeat dose one time in 2 hours if headache not relieved.  Dispense: 9 tablet; Refill: 5           Assessment & Plan  1. Low back pain.  She has been experiencing low back pain with radicular symptoms into her right leg, hip, and foot. An MRI of the lumbar spine on 08/19/2024 showed multiple levels of bulging disks and arthritis. Physical therapy has not been helpful. She is scheduled to see Dr. Gen Bailon on 09/18/2024 for further evaluation and possible injection. She has a severe needle phobia and prefers to wait on EMG until after the pain management consultation. She is currently on Celebrex daily. Flexeril caused diarrhea, and methocarbamol is not recommended due to her history of seizures. Tizanidine has been prescribed at a low dose to use if needed. If pain persists, further interventions, including surgery, may be considered.    2. Complex partial seizures.  She has been seizure-free since 2008 and successfully weaned off lamotrigine June 2023. She should continue to carry Nayzilam for emergency use, and she will notify if a refill is needed.     3. Carotid atherosclerosis.  She is currently on aspirin 81 mg daily and Lipitor 20 mg daily. A CTA of the neck on 10/26/2023 showed no evidence of carotid stenosis. She should continue her current medication regimen.    4. Weight loss.  She has  lost 16 pounds in the past year without trying. No red flag symptoms are present. She should follow up with her PCP for regular labs in October 2024 to monitor her condition.    5. Probable renal cysts.  Probable small bilateral renal cysts were incidentally noted on MRI of the lumbar spine. She should follow up with her PCP to inquire if a renal ultrasound is needed.    6. Cluster headaches.  Sumatriptan has been refilled to use as needed. She has not had any recent cluster headaches.    Follow-up  She will follow up in 7 months or sooner.    Reviewed medications, potential side effects and signs and symptoms to report. Discussed risk versus benefits of treatment plan with patient and/or family-including medications, labs and radiology that may be ordered. Addressed questions and concerns during visit. Patient and/or family verbalized understanding and agree with plan.    Time of visit today was 43 minutes which included MRI of the lumbar spine imaging and radiology review, discussing all findings in detail, discussing recommendations for low back as well as incidental renal cyst.  Completing exam.  Discussing findings and recommendations in detail moving forward.    During this visit the following were done:  Labs Reviewed []    Labs Ordered []    Radiology Reports Reviewed [x]    Radiology Ordered []    PCP Records Reviewed []    Referring Provider Records Reviewed []    ER Records Reviewed []    Hospital Records Reviewed []    History Obtained From Family []    Radiology Images Reviewed [x]    Other Reviewed [x]    Records Requested []      09/05/24   11:16 EDT    Patient or patient representative verbalized consent for the use of Ambient Listening during the visit with  LAVELL Gonzalez for chart documentation. 9/5/2024  12:54 EDT    Note to patient: The 21st Century Cures Act makes medical notes like these available to patients in the interest of transparency. However, be advised this is a medical  document. It is intended as peer to peer communication. It is written in medical language and may contain abbreviations or verbiage that are unfamiliar. It may appear blunt or direct. Medical documents are intended to carry relevant information, facts as evident, and the clinical opinion of the provider.

## 2024-09-05 NOTE — LETTER
September 5, 2024     Breanna Washington MD  [address]  [facility] [address]  [facility] [address]    Patient: Erin Zavala   YOB: 1955   Date of Visit: 9/5/2024       Dear Breanna Washington MD    Erin Zavala was in my office today. Below is a copy of my note.    If you have questions, please do not hesitate to call me. I look forward to following Erin along with you.         Sincerely,        LAVELL Gonzalez        CC: Gen Bailon MD    Subjective:     Patient ID: Erin Zavala is a 69 y.o. female.    CC:   Chief Complaint   Patient presents with   • Seizures   • Back Pain       HPI:   History of Present Illness  This is a pleasant 69-year-old female presenting for an 11-month follow-up on known right carotid atherosclerosis and complex partial seizures, which have been present since 2006. She has had prior cluster headaches which have been rare. She is here for follow-up and reevaluation of symptoms today.    She has been seizure-free since 2008 and was able to wean off lamotrigine completely with no recurrent seizure spells. This has been monitored closely. She has Nayzilam rescue nasal spray if needed. She has never had to use this.    She is currently on aspirin 81 mg daily and Lipitor 20 mg daily for the management of her carotid atherosclerosis.    She recently contacted our clinic in 08/2024 reporting a flare up in her chronic low back pain with pain radiating into her right leg, hip, and foot. An MRI of the lumbar spine was performed on 08/19/2024, ordered by my collaborating neurology physician. She was referred to pain management on 08/26/2024 for a possible injection and is scheduled to see Dr. Gen Bailon on 09/18/2024 for further evaluation. Physical therapy has not been helpful for her low back pain.    She experienced significant improvement in her condition following physical therapy last year. However, after mina COVID-19 during a  trip in 05/2024, she began experiencing pain in her right leg a few days later. Despite resuming physical therapy for the past 3 months, her overall conditioning has improved but the pain persists in her low back and radiating into her right leg. She reports no swelling, redness, or rash. The pain seems to originate from her lower back and intensifies as the day progresses, particularly when walking. By evening, she experiences severe pain and difficulty climbing stairs. Occasionally, she also feels pain in her left leg. She is highly motivated to seek treatment as the pain is significantly impacting her quality of life. She has noticed some weakness in her legs, particularly towards the end of the day, and occasionally experiences severe burning pain. Her pain improves when sitting, especially on a hard chair, but worsens when standing still. She manages her pain with Celebrex and Tylenol. She was prescribed cyclobenzaprine by Dr. Washington, but it caused stomach upset, so she discontinued it after four doses. She had a fall over a year ago where she landed on her left side. She does not use any mobility aids and reports no loss of sensation in her feet.     She takes Ambien as needed for insomnia.      She reports no headaches, including ice pick headaches, and uses sumatriptan as needed.    She has osteoporosis and is scheduled for a bone scan next month with Dr. Washington.     She has lost a considerable amount of weight unintentionally and has a decreased appetite. She reports no reflux. She had a colonoscopy 2 to 3 years ago. Her primary care physician informed her that her calcium levels were high, but she was not overly concerned and planned to recheck it at the next visit. Her sister recently underwent a parathyroidectomy due to high calcium levels in her blood. She is scheduled to see her PCP October 2024 with labs.    Prior significant neurology workup and history:  Partial epilepsy that began in 2006. She does  "have some focus issues. She has had no confusion, staring spells or other issues.  Last seizure in 2008 to her knowledge.      She was on a flight in July 2021 and developed a severe right sided headache with eye pain, felt like needles in her eye, eye watering, lasted 10 minutes of severe pain and then 24 hours of a dull ache. She had some issues with her insurance switching to medicare so she went 2 months without insurance so she didn't see her PCP or explain the symptoms until her regular follow up in November/December 2021. She did have a few more mild episodes since then that caused pain for 10 minutes but no visual changes, no speech changes, no numbness, tingling or weakness with symptoms. Saw PCP and had TSH, CMP, CBC< LDL, FT4 and Sed rate checked and all were normal per provider portal patient showed me during visit today. She did undergo repeat MRI brain w/o contrast 12/15/2021-we reviewed radiology report and imaging previously and this shows chronic small vessel ischemic changes and no acute abnormalities. Agree with radiology report. She is on aspirin, statin, HTN therapy. She did feel \"very strange\" while taking the medicine but it definitely helped. She is unsure of any triggers. History of migraines previously when she has menstrual cycles.      Previously followed Dr. Maldon[facility]. Prior spells of confusion confirmed seizures in 2007. She has no history of traumatic brain injury or CNS infection.  She has had a normal brain MRI in 2007 and an EEG at [facility] in 2007 by Dr. Malloy showed left hemisphere slow waves and sharps. Sleep deprived EEG in 2012 was normal. EEG awake and drowsy completely normal 6/29/19. 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 She reports she has been experiencing episodes of vasovagal syncope since she was a child.      Partial complex epilepsy, which " began in 2006. Her last seizure, to her knowledge, was in 2008. We have also been treating her for episodic cluster headaches with normal neuroimaging as far as no acute abnormalities, and have prescribed sumatriptan low dose to take as needed.      X-ray of the lumbar spine was completed on 10/21/2022 showing advanced degenerative disc changes. MRI of the lumbar spine obtained 12/10/2022 showing multiple levels of bulging disc. Options were presented to the patient as physical therapy, interventional pain specialist, or neurosurgery. Patient wanted to go to physical therapy as well as evaluation with pain management, so those orders were placed. She opted not to completed emg/ncvs or see pain management. Symptoms improved. Severe needle phobia.     She believes she was taking her medicine regularly during her EEG in 2019. She has been taking lamotrigine ER since 2006. Has weaned off Lamotrigine slowly and off since June 2023.    It appears that on 08/31/2023, she underwent a CTA of the head completed at [facility] for reports of an abnormal CT scan. This was ordered by her primary care provider. Per radiology alone, this shows arterial mural calcification in the right carotid siphon. No other significant stenosis was identified. Incidentally, she did undergo a cone beam CT by her dental specialist, and this did note some concern of calcified atheroma around the [place] of the carter, and this is when that imaging was ordered. She was referred to [facility] Dr. Shivam Laird and was not evaluated, but recommended medication management.     The following portions of the patient's history were reviewed and updated as appropriate: allergies, current medications, past family history, past medical history, past social history, past surgical history, and problem list.    Past Medical History:   Diagnosis Date   • Carotid atherosclerosis, right 10/13/2023   • Diarrhea    • Episodic cluster headache, not  intractable 01/14/2022   • H/O electroencephalogram    • History of MRI 2007    brain , normal    • Hyperlipidemia    • Hypertension    • Lumbar radiculopathy, right 03/24/2023   • Obstructive sleep apnea 10/13/2023   • Seizure    • Vomiting        Past Surgical History:   Procedure Laterality Date   • CHOLECYSTECTOMY     • ERCP AND ENDOSCOPY         Social History     Socioeconomic History   • Marital status:    Tobacco Use   • Smoking status: Never   • Smokeless tobacco: Never   Vaping Use   • Vaping status: Never Used   Substance and Sexual Activity   • Alcohol use: No     Comment: occasional   • Drug use: No   • Sexual activity: Not Currently     Birth control/protection: Abstinence, Post-menopausal       Family History   Problem Relation Age of Onset   • Alzheimer's disease Mother    • Dementia Mother    • Breast cancer Mother 62   • Cancer Mother    • Heart disease Father    • Heart failure Father    • Hypertension Father    • Leukemia Sister    • Leukemia Brother    • Cancer Maternal Grandmother    • No Known Problems Paternal Grandmother    • Breast cancer Maternal Aunt 62   • Breast cancer Paternal Aunt 42   • Stroke Maternal Grandfather 39   • Heart disease Paternal Grandfather    • Heart failure Paternal Grandfather    • Ovarian cancer Neg Hx           Current Outpatient Medications:   •  acyclovir (ZOVIRAX) 800 MG tablet, Take 1 tablet by mouth., Disp: , Rfl:   •  alendronate (FOSAMAX) 70 MG tablet, Take 1 tablet by mouth Every 7 (Seven) Days., Disp: , Rfl:   •  amLODIPine (NORVASC) 10 MG tablet, Take 1 tablet by mouth Daily., Disp: , Rfl:   •  aspirin 81 MG tablet, Take 1 tablet by mouth Daily., Disp: , Rfl:   •  atorvastatin (LIPITOR) 20 MG tablet, Take 1 tablet by mouth Daily., Disp: , Rfl:   •  celecoxib (CeleBREX) 200 MG capsule, , Disp: , Rfl:   •  hydroCHLOROthiazide (HYDRODIURIL) 25 MG tablet, Take 1 tablet by mouth Daily., Disp: , Rfl:   •  Nayzilam 5 MG/0.1ML solution, 0.1 mL into the  "nostril(s) as directed by provider Daily As Needed (at onset of seizure, may repeat once in opposite nostril in 10 minutes if needed)., Disp: 1 mL, Rfl: 0  •  SUMAtriptan (IMITREX) 25 MG tablet, Take one tablet at onset of headache. May repeat dose one time in 2 hours if headache not relieved., Disp: 9 tablet, Rfl: 5  •  tiZANidine (ZANAFLEX) 2 MG tablet, Take 1-2 tablets by mouth At Night As Needed for Muscle Spasms., Disp: 60 tablet, Rfl: 1     Review of Systems   Constitutional:  Positive for activity change.   Musculoskeletal:  Positive for back pain and gait problem.   Neurological:  Positive for weakness and numbness. Negative for headaches.   Psychiatric/Behavioral:  Positive for sleep disturbance. The patient is nervous/anxious.    All other systems reviewed and are negative.       Objective:  /80   Pulse 97   Ht 160 cm (63\")   Wt 68 kg (150 lb)   SpO2 92%   BMI 26.57 kg/m²     Neurological Exam  Mental Status  Alert. Oriented to person, place, time and situation. Memory is normal. Recent and remote memory are intact. Speech is normal. Language is fluent with no aphasia. Attention and concentration are normal. Fund of knowledge is appropriate for level of education.    Cranial Nerves  CN II: Visual acuity is normal. Visual fields full to confrontation.  CN III, IV, VI: Extraocular movements intact bilaterally. Normal lids and orbits bilaterally. Pupils equal round and reactive to light bilaterally.  CN V: Facial sensation is normal.  CN VII: Full and symmetric facial movement.  CN IX, X: Palate elevates symmetrically. Normal gag reflex.  CN XI: Shoulder shrug strength is normal.  CN XII: Tongue midline without atrophy or fasciculations.    Motor  Normal muscle bulk throughout. No fasciculations present. Normal muscle tone. No abnormal involuntary movements. Strength is 5/5 throughout all four extremities.    Sensory  Light touch abnormality: Pinprick abnormality: Temperature abnormality: Vibration " abnormality: Proprioception is normal in upper and lower extremities.     All significantly decreased sensation RLE, Normal LLE.    Reflexes  Right Plantar: downgoing  Left Plantar: downgoing    Right pathological reflexes: Alexander's absent. Ankle clonus absent.  Left pathological reflexes: Alexander's absent. Ankle clonus absent.    Coordination    Finger-to-nose, rapid alternating movements and heel-to-shin normal bilaterally without dysmetria.    Gait  Casual gait: Wide stance. Normal stride length. Antalgic gait. Mild antalgia. Normal right arm swing. Normal left arm swing.Normal toe walking. Normal heel walking. Normal tandem gait. Romberg is absent. Able to rise from chair without using arms.    Physical Exam  Constitutional:       Appearance: Normal appearance.   Eyes:      General: Lids are normal.      Extraocular Movements: Extraocular movements intact.      Pupils: Pupils are equal, round, and reactive to light.   Musculoskeletal:      Lumbar back: Spasms and tenderness (right side) present. No swelling, edema, deformity, signs of trauma, lacerations or bony tenderness. Decreased range of motion. Positive right straight leg raise test. No scoliosis.   Neurological:      Mental Status: She is alert.      Motor: Motor strength is normal.     Coordination: Coordination is intact. Romberg sign negative.   Psychiatric:         Mood and Affect: Mood is anxious.         Speech: Speech normal.         Behavior: Behavior normal.         Thought Content: Thought content normal.         Cognition and Memory: Cognition and memory normal.         Judgment: Judgment normal.       Results:  Results  Laboratory Studies  Thyroid labs normal in August 2023 with PCP.    Imaging  X-ray of the lumbar spine showed multilevel thoracic and lumbar spondylosis which was moderate 7/2024 ordered by PCP. MRI of the lumbar spine shows multiple levels of bulging disks and arthritis, neuroforaminal and central spinal stenosis  8/19/2024-relatively stable to slightly advanced compared to 2022 imaging. CTA of the neck completed 10/26/2023 showing no evidence of carotid stenosis.    Assessment/Plan:     Diagnoses and all orders for this visit:    1. Partial symptomatic epilepsy with complex partial seizures, not intractable, without status epilepticus (Primary)  Comments:  off AEDs since 2023, no seizures since 2008, continue to monitor    2. Lumbar radiculopathy, right  Comments:  pending pain management consult with Dr. Bailon 9/18/2024  Orders:  -     tiZANidine (ZANAFLEX) 2 MG tablet; Take 1-2 tablets by mouth At Night As Needed for Muscle Spasms.  Dispense: 60 tablet; Refill: 1    3. Lumbar spondylolysis  Comments:  pending pain management consult with Dr. Bailon 9/18/2024  Orders:  -     tiZANidine (ZANAFLEX) 2 MG tablet; Take 1-2 tablets by mouth At Night As Needed for Muscle Spasms.  Dispense: 60 tablet; Refill: 1    4. Episodic cluster headache, not intractable  Comments:  MRI BRAIN 12/15/2021-reviewed-chronic small vessel changes, no acute abnormalities, cont aspirin/statin  Orders:  -     SUMAtriptan (IMITREX) 25 MG tablet; Take one tablet at onset of headache. May repeat dose one time in 2 hours if headache not relieved.  Dispense: 9 tablet; Refill: 5           Assessment & Plan  1. Low back pain.  She has been experiencing low back pain with radicular symptoms into her right leg, hip, and foot. An MRI of the lumbar spine on 08/19/2024 showed multiple levels of bulging disks and arthritis. Physical therapy has not been helpful. She is scheduled to see Dr. Gen Bailon on 09/18/2024 for further evaluation and possible injection. She has a severe needle phobia and prefers to wait on EMG until after the pain management consultation. She is currently on Celebrex daily. Flexeril caused diarrhea, and methocarbamol is not recommended due to her history of seizures. Tizanidine has been prescribed at a low dose to use if needed. If  pain persists, further interventions, including surgery, may be considered.    2. Complex partial seizures.  She has been seizure-free since 2008 and successfully weaned off lamotrigine June 2023. She should continue to carry Nayzilam for emergency use, and she will notify if a refill is needed.     3. Carotid atherosclerosis.  She is currently on aspirin 81 mg daily and Lipitor 20 mg daily. A CTA of the neck on 10/26/2023 showed no evidence of carotid stenosis. She should continue her current medication regimen.    4. Weight loss.  She has lost 16 pounds in the past year without trying. No red flag symptoms are present. She should follow up with her PCP for regular labs in October 2024 to monitor her condition.    5. Probable renal cysts.  Probable small bilateral renal cysts were incidentally noted on MRI of the lumbar spine. She should follow up with her PCP to inquire if a renal ultrasound is needed.    6. Cluster headaches.  Sumatriptan has been refilled to use as needed. She has not had any recent cluster headaches.    Follow-up  She will follow up in 7 months or sooner.    Reviewed medications, potential side effects and signs and symptoms to report. Discussed risk versus benefits of treatment plan with patient and/or family-including medications, labs and radiology that may be ordered. Addressed questions and concerns during visit. Patient and/or family verbalized understanding and agree with plan.    Time of visit today was 43 minutes which included MRI of the lumbar spine imaging and radiology review, discussing all findings in detail, discussing recommendations for low back as well as incidental renal cyst.  Completing exam.  Discussing findings and recommendations in detail moving forward.    During this visit the following were done:  Labs Reviewed []    Labs Ordered []    Radiology Reports Reviewed [x]    Radiology Ordered []    PCP Records Reviewed []    Referring Provider Records Reviewed []    ER  Records Reviewed []    Hospital Records Reviewed []    History Obtained From Family []    Radiology Images Reviewed [x]    Other Reviewed [x]    Records Requested []      09/05/24   11:16 EDT    Patient or patient representative verbalized consent for the use of Ambient Listening during the visit with  LAVELL Gonzalez for chart documentation. 9/5/2024  12:54 EDT    Note to patient: The 21st Century Cures Act makes medical notes like these available to patients in the interest of transparency. However, be advised this is a medical document. It is intended as peer to peer communication. It is written in medical language and may contain abbreviations or verbiage that are unfamiliar. It may appear blunt or direct. Medical documents are intended to carry relevant information, facts as evident, and the clinical opinion of the provider.

## 2024-09-06 ENCOUNTER — PATIENT ROUNDING (BHMG ONLY) (OUTPATIENT)
Dept: NEUROLOGY | Facility: CLINIC | Age: 69
End: 2024-09-06
Payer: MEDICARE

## 2024-09-07 NOTE — PROGRESS NOTES
A My-Chart message has been sent to the patient for PATIENT ROUNDING with [facility] [place] [place]

## 2024-09-18 ENCOUNTER — OFFICE VISIT (OUTPATIENT)
Dept: PAIN MEDICINE | Facility: CLINIC | Age: 69
End: 2024-09-18
Payer: MEDICARE

## 2024-09-18 VITALS — HEIGHT: 63 IN | BODY MASS INDEX: 26.58 KG/M2 | WEIGHT: 150 LBS

## 2024-09-18 DIAGNOSIS — M48.062 SPINAL STENOSIS OF LUMBAR REGION WITH NEUROGENIC CLAUDICATION: Primary | ICD-10-CM

## 2024-09-18 DIAGNOSIS — M54.16 LUMBAR RADICULOPATHY: ICD-10-CM

## 2024-09-18 DIAGNOSIS — M47.817 LUMBOSACRAL SPONDYLOSIS WITHOUT MYELOPATHY: ICD-10-CM

## 2024-09-18 PROCEDURE — 99204 OFFICE O/P NEW MOD 45 MIN: CPT | Performed by: STUDENT IN AN ORGANIZED HEALTH CARE EDUCATION/TRAINING PROGRAM

## 2024-09-18 PROCEDURE — 1159F MED LIST DOCD IN RCRD: CPT | Performed by: STUDENT IN AN ORGANIZED HEALTH CARE EDUCATION/TRAINING PROGRAM

## 2024-09-18 PROCEDURE — 1125F AMNT PAIN NOTED PAIN PRSNT: CPT | Performed by: STUDENT IN AN ORGANIZED HEALTH CARE EDUCATION/TRAINING PROGRAM

## 2024-09-18 PROCEDURE — 1160F RVW MEDS BY RX/DR IN RCRD: CPT | Performed by: STUDENT IN AN ORGANIZED HEALTH CARE EDUCATION/TRAINING PROGRAM

## 2024-09-25 ENCOUNTER — HOSPITAL ENCOUNTER (OUTPATIENT)
Dept: GENERAL RADIOLOGY | Facility: HOSPITAL | Age: 69
Discharge: HOME | End: 2024-09-25
Admitting: STUDENT IN AN ORGANIZED HEALTH CARE EDUCATION/TRAINING PROGRAM
Payer: MEDICARE

## 2024-09-25 ENCOUNTER — OFFICE VISIT (OUTPATIENT)
Dept: NEUROSURGERY | Facility: CLINIC | Age: 69
End: 2024-09-25
Payer: MEDICARE

## 2024-09-25 VITALS — BODY MASS INDEX: 25.81 KG/M2 | WEIGHT: 151.2 LBS | TEMPERATURE: 98.5 F | HEIGHT: 64 IN

## 2024-09-25 DIAGNOSIS — M43.16 SPONDYLOLISTHESIS OF LUMBAR REGION: Primary | ICD-10-CM

## 2024-09-25 DIAGNOSIS — M48.062 SPINAL STENOSIS OF LUMBAR REGION WITH NEUROGENIC CLAUDICATION: ICD-10-CM

## 2024-09-25 DIAGNOSIS — M54.17 LUMBOSACRAL RADICULOPATHY AT L5: ICD-10-CM

## 2024-09-25 DIAGNOSIS — M48.061 STENOSIS OF LATERAL RECESS OF LUMBAR SPINE: ICD-10-CM

## 2024-09-25 PROCEDURE — 99204 OFFICE O/P NEW MOD 45 MIN: CPT | Performed by: NEUROLOGICAL SURGERY

## 2024-09-25 PROCEDURE — 72114 X-RAY EXAM L-S SPINE BENDING: CPT

## 2024-09-25 RX ORDER — IBUPROFEN 200 MG
800 TABLET ORAL ONCE
OUTPATIENT
Start: 2024-09-25 | End: 2024-09-25

## 2024-09-25 RX ORDER — SODIUM CHLORIDE 0.9 % (FLUSH) 0.9 %
10 SYRINGE (ML) INJECTION AS NEEDED
OUTPATIENT
Start: 2024-09-25

## 2024-09-25 RX ORDER — SODIUM CHLORIDE 0.9 % (FLUSH) 0.9 %
10 SYRINGE (ML) INJECTION EVERY 12 HOURS SCHEDULED
OUTPATIENT
Start: 2024-09-25

## 2024-09-25 RX ORDER — SODIUM CHLORIDE 9 MG/ML
40 INJECTION, SOLUTION INTRAVENOUS AS NEEDED
OUTPATIENT
Start: 2024-09-25

## 2024-09-25 RX ORDER — ACETAMINOPHEN 325 MG/1
1000 TABLET ORAL ONCE
OUTPATIENT
Start: 2024-09-25 | End: 2024-09-25

## 2024-09-25 RX ORDER — OXYCODONE HCL 10 MG/1
10 TABLET, FILM COATED, EXTENDED RELEASE ORAL ONCE
OUTPATIENT
Start: 2024-09-25 | End: 2024-09-25

## 2024-09-25 RX ORDER — CHLORHEXIDINE GLUCONATE 40 MG/ML
SOLUTION TOPICAL
Qty: 120 ML | Refills: 0 | Status: SHIPPED | OUTPATIENT
Start: 2024-09-25

## 2024-09-25 RX ORDER — PREGABALIN 75 MG/1
150 CAPSULE ORAL ONCE
OUTPATIENT
Start: 2024-09-25 | End: 2024-09-25

## 2024-10-01 ENCOUNTER — TELEPHONE (OUTPATIENT)
Dept: PAIN MEDICINE | Facility: CLINIC | Age: 69
End: 2024-10-01
Payer: MEDICARE

## 2024-10-01 NOTE — TELEPHONE ENCOUNTER
Caller: MAI    Relationship to patient: SELF    Best call back number: [phone]    Type of visit: EPIDURAL SCHEDULED ON 10/3/24 NEEDS TO BE CANCELLED- SHE NEEDS MORE TIME TO THINK ABOUT TREATMENT OPTIONS- PLEASE CALL

## 2024-10-02 NOTE — TELEPHONE ENCOUNTER
Attempted to contact patient but no answer. LVM that we have cancelled her procedure tomorrow per her request and to call the office with any questions/concerns.     Provided a call back number.

## 2024-10-29 ENCOUNTER — HOSPITAL ENCOUNTER (OUTPATIENT)
Dept: GENERAL RADIOLOGY | Facility: HOSPITAL | Age: 69
Discharge: HOME | End: 2024-10-29
Admitting: INTERNAL MEDICINE
Payer: MEDICARE

## 2024-10-29 ENCOUNTER — TRANSCRIBE ORDERS (OUTPATIENT)
Dept: GENERAL RADIOLOGY | Facility: HOSPITAL | Age: 69
End: 2024-10-29
Payer: MEDICARE

## 2024-10-29 DIAGNOSIS — R63.4 UNINTENTIONAL WEIGHT LOSS: ICD-10-CM

## 2024-10-29 DIAGNOSIS — R63.4 UNINTENTIONAL WEIGHT LOSS: Primary | ICD-10-CM

## 2024-10-29 PROCEDURE — 71046 X-RAY EXAM CHEST 2 VIEWS: CPT

## 2024-10-31 ENCOUNTER — TRANSCRIBE ORDERS (OUTPATIENT)
Dept: ADMINISTRATIVE | Facility: HOSPITAL | Age: 69
End: 2024-10-31
Payer: MEDICARE

## 2024-10-31 DIAGNOSIS — R94.31 ABNORMAL ELECTROCARDIOGRAM: Primary | ICD-10-CM

## 2024-12-12 ENCOUNTER — HOSPITAL ENCOUNTER (OUTPATIENT)
Dept: NUCLEAR MEDICINE | Facility: HOSPITAL | Age: 69
Discharge: HOME | End: 2024-12-12
Payer: MEDICARE

## 2024-12-12 DIAGNOSIS — R94.31 ABNORMAL ELECTROCARDIOGRAM: ICD-10-CM

## 2024-12-12 LAB
BH CV REST NUCLEAR ISOTOPE DOSE: 9.9 MCI
BH CV STRESS COMMENTS STAGE 1: NORMAL
BH CV STRESS DOSE REGADENOSON STAGE 1: 0.4
BH CV STRESS DURATION MIN STAGE 1: 0
BH CV STRESS DURATION SEC STAGE 1: 10
BH CV STRESS NUCLEAR ISOTOPE DOSE: 28.1 MCI
BH CV STRESS PROTOCOL 1: NORMAL
BH CV STRESS RECOVERY BP: NORMAL MMHG
BH CV STRESS RECOVERY HR: 75 BPM
BH CV STRESS STAGE 1: 1
LV EF NUC BP: 80 %
MAXIMAL PREDICTED HEART RATE: 152 BPM
PERCENT MAX PREDICTED HR: 57.89 %
STRESS BASELINE BP: NORMAL MMHG
STRESS BASELINE HR: 68 BPM
STRESS PERCENT HR: 68 %
STRESS POST PEAK BP: NORMAL MMHG
STRESS POST PEAK HR: 88 BPM
STRESS TARGET HR: 129 BPM

## 2024-12-12 PROCEDURE — 34310000005 TECHNETIUM SESTAMIBI: Performed by: INTERNAL MEDICINE

## 2024-12-12 PROCEDURE — 25010000002 REGADENOSON 0.4 MG/5ML SOLUTION: Performed by: INTERNAL MEDICINE

## 2024-12-12 PROCEDURE — A9500 TC99M SESTAMIBI: HCPCS | Performed by: INTERNAL MEDICINE

## 2024-12-12 PROCEDURE — 93017 CV STRESS TEST TRACING ONLY: CPT

## 2024-12-12 PROCEDURE — 78452 HT MUSCLE IMAGE SPECT MULT: CPT

## 2024-12-12 RX ORDER — REGADENOSON 0.08 MG/ML
0.4 INJECTION, SOLUTION INTRAVENOUS
Status: COMPLETED | OUTPATIENT
Start: 2024-12-12 | End: 2024-12-12

## 2024-12-12 RX ADMIN — TECHNETIUM TC 99M SESTAMIBI 1 DOSE: 1 INJECTION INTRAVENOUS at 09:18

## 2024-12-12 RX ADMIN — REGADENOSON 0.4 MG: 0.08 INJECTION, SOLUTION INTRAVENOUS at 09:18

## 2024-12-12 RX ADMIN — TECHNETIUM TC 99M SESTAMIBI 1 DOSE: 1 INJECTION INTRAVENOUS at 07:12

## 2025-01-23 ENCOUNTER — HOSPITAL ENCOUNTER (OUTPATIENT)
Dept: MAMMOGRAPHY | Facility: HOSPITAL | Age: 70
Discharge: HOME | End: 2025-01-23
Admitting: INTERNAL MEDICINE
Payer: MEDICARE

## 2025-01-23 DIAGNOSIS — Z12.31 SCREENING MAMMOGRAM FOR BREAST CANCER: ICD-10-CM

## 2025-01-23 PROCEDURE — 77063 BREAST TOMOSYNTHESIS BI: CPT

## 2025-01-23 PROCEDURE — 77067 SCR MAMMO BI INCL CAD: CPT

## 2025-02-18 ENCOUNTER — HOSPITAL ENCOUNTER (OUTPATIENT)
Dept: GENERAL RADIOLOGY | Facility: HOSPITAL | Age: 70
Discharge: HOME | End: 2025-02-18
Admitting: PHYSICIAN ASSISTANT
Payer: MEDICARE

## 2025-02-18 ENCOUNTER — TRANSCRIBE ORDERS (OUTPATIENT)
Dept: GENERAL RADIOLOGY | Facility: HOSPITAL | Age: 70
End: 2025-02-18
Payer: MEDICARE

## 2025-02-18 DIAGNOSIS — M54.16 RADICULOPATHY, LUMBAR REGION: ICD-10-CM

## 2025-02-18 DIAGNOSIS — M54.16 RADICULOPATHY, LUMBAR REGION: Primary | ICD-10-CM

## 2025-02-18 PROCEDURE — 72114 X-RAY EXAM L-S SPINE BENDING: CPT

## 2025-05-07 ENCOUNTER — OFFICE VISIT (OUTPATIENT)
Dept: NEUROLOGY | Facility: CLINIC | Age: 70
End: 2025-05-07
Payer: MEDICARE

## 2025-05-07 VITALS
SYSTOLIC BLOOD PRESSURE: 128 MMHG | BODY MASS INDEX: 28.75 KG/M2 | HEART RATE: 79 BPM | WEIGHT: 168.4 LBS | OXYGEN SATURATION: 95 % | DIASTOLIC BLOOD PRESSURE: 74 MMHG | HEIGHT: 64 IN

## 2025-05-07 DIAGNOSIS — G40.209 PARTIAL SYMPTOMATIC EPILEPSY WITH COMPLEX PARTIAL SEIZURES, NOT INTRACTABLE, WITHOUT STATUS EPILEPTICUS: ICD-10-CM

## 2025-05-07 DIAGNOSIS — M48.061 SPINAL STENOSIS OF LUMBAR REGION, UNSPECIFIED WHETHER NEUROGENIC CLAUDICATION PRESENT: Primary | ICD-10-CM

## 2025-05-07 DIAGNOSIS — M43.16 SPONDYLOLISTHESIS OF LUMBAR REGION: ICD-10-CM

## 2025-05-07 DIAGNOSIS — G44.019 EPISODIC CLUSTER HEADACHE, NOT INTRACTABLE: ICD-10-CM

## 2025-05-07 DIAGNOSIS — I65.23 ATHEROSCLEROSIS OF BOTH CAROTID ARTERIES: ICD-10-CM

## 2025-05-07 PROBLEM — G89.29 CHRONIC PAIN: Status: ACTIVE | Noted: 2024-09-30

## 2025-05-07 PROBLEM — M47.20 RADICULOPATHY DUE TO SPONDYLOSIS: Status: ACTIVE | Noted: 2024-09-30

## 2025-05-07 PROBLEM — R63.4 UNINTENTIONAL WEIGHT LOSS: Status: ACTIVE | Noted: 2025-05-07

## 2025-05-07 PROCEDURE — 3074F SYST BP LT 130 MM HG: CPT | Performed by: NURSE PRACTITIONER

## 2025-05-07 PROCEDURE — 1160F RVW MEDS BY RX/DR IN RCRD: CPT | Performed by: NURSE PRACTITIONER

## 2025-05-07 PROCEDURE — 1159F MED LIST DOCD IN RCRD: CPT | Performed by: NURSE PRACTITIONER

## 2025-05-07 PROCEDURE — 3078F DIAST BP <80 MM HG: CPT | Performed by: NURSE PRACTITIONER

## 2025-05-07 PROCEDURE — 99214 OFFICE O/P EST MOD 30 MIN: CPT | Performed by: NURSE PRACTITIONER

## 2025-05-07 RX ORDER — PREGABALIN 50 MG/1
50 CAPSULE ORAL DAILY
COMMUNITY
Start: 2024-12-21

## 2025-05-07 RX ORDER — MIDAZOLAM 5 MG/.1ML
5 SPRAY NASAL DAILY PRN
Qty: 1 ML | Refills: 0 | Status: SHIPPED | OUTPATIENT
Start: 2025-05-07

## 2025-05-07 RX ORDER — ZOLPIDEM TARTRATE 12.5 MG/1
12.5 TABLET, FILM COATED, EXTENDED RELEASE ORAL DAILY
COMMUNITY
Start: 2024-10-29

## 2025-05-07 NOTE — LETTER
May 7, 2025     Breanna Washington MD  [address] [facility] [address]  [facility] [place] [address]    Patient: Erin Zavala   YOB: 1955   Date of Visit: 5/7/2025       Dear Breanna Washington MD    Erin Zavala was in my office today. Below is a copy of my note.    If you have questions, please do not hesitate to call me. I look forward to following Erin along with you.         Sincerely,        LAVELL Gonzalez        CC: No Recipients    Subjective:     Patient ID: Erin Zavala is a 69 y.o. female.    CC:   Chief Complaint   Patient presents with   • Seizures       HPI:   History of Present Illness  This is a pleasant 69-year-old female who was last seen in the clinic on 09/05/2024 for right carotid atherosclerosis, complex partial seizures present since 2006, and rare cluster headaches. She has been seizure-free since 2008 and has completely weaned off lamotrigine with no recurrent seizures. She does have Nayzilam rescue nasal spray if needed. She is currently on aspirin and Lipitor for the management of carotid atherosclerosis with normal CTA neck 10/2023 normal. She has followed with interventional pain medicine for chronic low back pain. At her last visit to the clinic, she reported weight loss and was encouraged to follow up with her primary care provider. She has sumatriptan if needed for cluster headaches. She is here for a follow-up.    She has undergone 3 steroid injections under the care of Dr. Ahsa Castanon with [facility]. The first injection provided 2 months of pain relief, while the second was ineffective. The third injection was administered on 04/28/2025, and she believes it has reached its peak effect. Despite these treatments, she continues to experience pain and is considering surgical intervention. She has limited support in [place] and is contemplating surgery in [place], where her son and daughter-in-law reside. They have  contacted [facility] and identified Dr. Barkley as a potential surgeon. He has requested her MRI of the Lumbar Spine images be sent to his clinic. Her appointment with Dr. Barkley is scheduled for 05/21/2025 at 9:00 AM. She has been diagnosed with spondylolisthesis and experiences pain radiating down her right leg, extending to the side of her calf, crossing over the top of her foot, and into her big toe. She is not currently using any muscle relaxers. She reports no falls in the past year but did experience mild dizziness following her last injection on 04/28/2025. This dizziness typically occurs when she first lies down or stands up and resolves within 30 seconds. She is uncertain if this symptom is related to the injections. She admits to inadequate water intake and reports no numbness, tingling, or sudden loss of feeling in her private area.    She has not experienced any seizures or cluster headaches. She has not required a refill of sumatriptan for an extended period. She is uncertain about the need for a refill of Nayzilam.    She continues to take aspirin and cholesterol medication.    MEDICATIONS  Current: Aspirin, Lipitor, sumatriptan, Nayzilam (as needed).    Prior significant neurology workup and history:  Partial epilepsy that began in 2006. She does have some focus issues. She has had no confusion, staring spells or other issues.  Last seizure in 2008 to her knowledge.      She was on a flight in July 2021 and developed a severe right sided headache with eye pain, felt like needles in her eye, eye watering, lasted 10 minutes of severe pain and then 24 hours of a dull ache. She had some issues with her insurance switching to medicare so she went 2 months without insurance so she didn't see her PCP or explain the symptoms until her regular follow up in November/December 2021. She did have a few more mild episodes since then that caused pain for 10 minutes but no visual changes, no speech  "changes, no numbness, tingling or weakness with symptoms. Saw PCP and had TSH, CMP, CBC< LDL, FT4 and Sed rate checked and all were normal per provider portal patient showed me during visit today. She did undergo repeat MRI brain w/o contrast 12/15/2021-we reviewed radiology report and imaging previously and this shows chronic small vessel ischemic changes and no acute abnormalities. Agree with radiology report. She is on aspirin, statin, HTN therapy. She did feel \"very strange\" while taking the medicine but it definitely helped. She is unsure of any triggers. History of migraines previously when she has menstrual cycles.      Previously followed Dr. [facility]. Prior spells of confusion confirmed seizures in 2007. She has no history of traumatic brain injury or CNS infection.  She has had a normal brain MRI in 2007 and an EEG at [facility] in 2007 by Dr. Malloy showed left hemisphere slow waves and sharps. Sleep deprived EEG in 2012 was normal. EEG awake and drowsy completely normal 6/29/19. 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 She reports she has been experiencing episodes of vasovagal syncope since she was a child.      Partial complex epilepsy, which began in 2006. Her last seizure, to her knowledge, was in 2008. We have also been treating her for episodic cluster headaches with normal neuroimaging as far as no acute abnormalities, and have prescribed sumatriptan low dose to take as needed.      X-ray of the lumbar spine was completed on 10/21/2022 showing advanced degenerative disc changes. MRI of the lumbar spine obtained 12/10/2022 showing multiple levels of bulging disc. Options were presented to the patient as physical therapy, interventional pain specialist, or neurosurgery. Patient wanted to go to physical therapy as well as evaluation with pain management, so those orders were " placed. She opted not to completed emg/ncvs or see pain management. Symptoms improved. Severe needle phobia.     She believes she was taking her medicine regularly during her EEG in 2019. She has been taking lamotrigine ER since 2006. Has weaned off Lamotrigine slowly and off since June 2023.      08/31/2023, she underwent a CTA of the head completed at [facility] for reports of an abnormal CT scan. This was ordered by her primary care provider. Per radiology alone, this shows arterial mural calcification in the right carotid siphon. No other significant stenosis was identified. Incidentally, she did undergo a cone beam CT by her dental specialist, and this did note some concern of calcified atheroma around the [place] of the carter, and this is when that imaging was ordered. She was referred to [facility] [place], operated by [facility] Dr. Shivam Laird and was not evaluated, but recommended medication management.     X-ray of the lumbar spine showed multilevel thoracic and lumbar spondylosis which was moderate     7/2024 ordered by PCP. MRI of the lumbar spine shows multiple levels of bulging disks and arthritis, neuroforaminal and central spinal stenosis 8/19/2024-relatively stable to slightly advanced compared to 2022 imaging.     The following portions of the patient's history were reviewed and updated as appropriate: allergies, current medications, past family history, past medical history, past social history, past surgical history, and problem list.    Past Medical History:   Diagnosis Date   • Carotid atherosclerosis, right 10/13/2023   • Diarrhea    • Episodic cluster headache, not intractable 01/14/2022   • H/O electroencephalogram    • History of MRI 2007    brain , normal    • Hyperlipidemia    • Hypertension    • Low back pain    • Lumbar radiculopathy, right 03/24/2023   • Obstructive sleep apnea 10/13/2023   • Seizure    • Vomiting        Past Surgical History:   Procedure Laterality Date   • CHOLECYSTECTOMY     • ERCP  AND ENDOSCOPY         Social History     Socioeconomic History   • Marital status:    Tobacco Use   • Smoking status: Never   • Smokeless tobacco: Never   Vaping Use   • Vaping status: Never Used   Substance and Sexual Activity   • Alcohol use: No     Comment: occasional   • Drug use: No   • Sexual activity: Not Currently     Partners: Male     Birth control/protection: Abstinence, Post-menopausal       Family History   Problem Relation Age of Onset   • Alzheimer's disease Mother    • Dementia Mother    • Breast cancer Mother 62   • Cancer Mother    • Heart disease Father    • Heart failure Father    • Hypertension Father    • Leukemia Sister    • Leukemia Brother    • Cancer Maternal Grandmother    • No Known Problems Paternal Grandmother    • Breast cancer Maternal Aunt 62   • Breast cancer Paternal Aunt 42   • Stroke Maternal Grandfather 39   • Heart disease Paternal Grandfather    • Heart failure Paternal Grandfather    • Ovarian cancer Neg Hx           Current Outpatient Medications:   •  acyclovir (ZOVIRAX) 800 MG tablet, Take 1 tablet by mouth., Disp: , Rfl:   •  alendronate (FOSAMAX) 70 MG tablet, Take 1 tablet by mouth Every 7 (Seven) Days., Disp: , Rfl:   •  amLODIPine (NORVASC) 10 MG tablet, Take 1 tablet by mouth Daily., Disp: , Rfl:   •  aspirin 81 MG tablet, Take 1 tablet by mouth Daily., Disp: , Rfl:   •  atorvastatin (LIPITOR) 20 MG tablet, Take 1 tablet by mouth Daily., Disp: , Rfl:   •  celecoxib (CeleBREX) 200 MG capsule, , Disp: , Rfl:   •  Nayzilam 5 MG/0.1ML solution, Administer 0.1 mL into the nostril(s) as directed by provider Daily As Needed (at onset of seizure, may repeat once in opposite nostril in 10 minutes if needed)., Disp: 1 mL, Rfl: 0  •  pregabalin (LYRICA) 50 MG capsule, Take 1 capsule by mouth Daily., Disp: , Rfl:   •  SUMAtriptan (IMITREX) 25 MG tablet, Take one tablet at onset of headache. May repeat dose one time in 2 hours if headache not relieved., Disp: 9 tablet,  "Rfl: 5  •  zolpidem CR (AMBIEN CR) 12.5 MG CR tablet, Take 1 tablet by mouth Daily., Disp: , Rfl:      Review of Systems   Musculoskeletal:  Positive for back pain.   Neurological:  Negative for seizures and headaches.   All other systems reviewed and are negative.       Objective:  /74   Pulse 79   Ht 162.6 cm (64\")   Wt 76.4 kg (168 lb 6.4 oz)   SpO2 95%   BMI 28.91 kg/m²     Neurological Exam  Mental Status  Alert. Oriented to person, place, time and situation. Memory is normal. Recent and remote memory are intact. Speech is normal. Language is fluent with no aphasia. Attention and concentration are normal. Fund of knowledge is appropriate for level of education.     Cranial Nerves  CN II: Visual acuity is normal. Visual fields full to confrontation.  CN III, IV, VI: Extraocular movements intact bilaterally. Normal lids and orbits bilaterally. Pupils equal round and reactive to light bilaterally.  CN V: Facial sensation is normal.  CN VII: Full and symmetric facial movement.  CN IX, X: Palate elevates symmetrically. Normal gag reflex.  CN XI: Shoulder shrug strength is normal.  CN XII: Tongue midline without atrophy or fasciculations.     Motor  Normal muscle bulk throughout. No fasciculations present. Normal muscle tone. No abnormal involuntary movements. Strength is 5/5 throughout all four extremities.     Sensory  Light touch abnormality: Pinprick abnormality: Temperature abnormality: Vibration abnormality: Proprioception is normal in upper and lower extremities.   All significantly decreased sensation RLE, Normal LLE.     Reflexes  Right Plantar: downgoing  Left Plantar: downgoing     Right pathological reflexes: Ankle clonus absent.  Left pathological reflexes: Ankle clonus absent.     Coordination     Finger-to-nose, rapid alternating movements and heel-to-shin normal bilaterally without dysmetria.     Gait  Casual gait: Wide stance. Normal stride length. Antalgic gait. Mild antalgia. Normal " right arm swing. Normal left arm swing.Normal toe walking. Normal heel walking. Normal tandem gait. Romberg is absent. Able to rise from chair without using arms.     Physical Exam  Constitutional:       Appearance: Normal appearance.   Eyes:      General: Lids are normal.      Extraocular Movements: Extraocular movements intact.      Pupils: Pupils are equal, round, and reactive to light.   Musculoskeletal:      Lumbar back: Spasms and tenderness (right side-chronic) present. No swelling, edema, deformity, signs of trauma, lacerations or bony tenderness. Decreased range of motion.   Neurological:      Mental Status: She is alert.      Motor: Motor strength is normal.     Coordination: Coordination is intact. Romberg sign negative.   Psychiatric:         Mood and Affect: Mood is anxious.         Speech: Speech normal.         Behavior: Behavior normal.         Thought Content: Thought content normal.         Cognition and Memory: Cognition and memory normal.         Judgment: Judgment normal.     Results:  Results    XR Spine Lumbar Complete With Flex & Ext  Result Date: 2/21/2025  Impression: Scoliosis and lumbar degenerative disease with no evidence of instability Electronically Signed: Alvaro Willams  2/21/2025 1:26 PM EST  Workstation ID: [id number]    Assessment/Plan:     Diagnoses and all orders for this visit:    1. Spinal stenosis of lumbar region, unspecified whether neurogenic claudication present (Primary)  -     Ambulatory Referral to Spine Surgery    2. Spondylolisthesis of lumbar region  -     Ambulatory Referral to Spine Surgery    3. Partial symptomatic epilepsy with complex partial seizures, not intractable, without status epilepticus  -     Nayzilam 5 MG/0.1ML solution; Administer 0.1 mL into the nostril(s) as directed by provider Daily As Needed (at onset of seizure, may repeat once in opposite nostril in 10 minutes if needed).  Dispense: 1 mL; Refill: 0    4. Episodic cluster headache, not  intractable    5. Atherosclerosis of both carotid arteries  Comments:  aspirin and statin continue. cta head no carotid stenosis.           Assessment & Plan  1. Chronic low back pain.  She has undergone three epidural steroid injections with minimal improvement. The first injection provided 2 months of pain relief, while the subsequent injections were less effective. She is considering surgery and has scheduled an appointment with Dr. Barkley at [facility] in [place] on 2025. A referral has been placed, and efforts are underway to power share her neuroimaging to their clinic. She is advised to continue pushing fluids to manage occasional dizziness post-injection. If symptoms persist, she will notify the pain clinic.    2. Complex partial seizures.  She has been seizure-free since  and has weaned off lamotrigine without recurrence. Nayzilam rescue nasal spray will be refilled as it may have . She is advised to continue Nayzilam as needed in case of another seizure.    3. Cluster headaches.  She has not experienced any recent cluster headaches. She is advised to continue using sumatriptan sparingly if needed.    4. Right carotid atherosclerosis-resolved on CTA neck 10/2023.  She is currently on aspirin and Lipitor for management. She is advised to continue her current regimen of aspirin and statin therapy.    Follow-up  She will follow up in December or sooner if needed.    Reviewed medications, potential side effects and signs and symptoms to report. Discussed risk versus benefits of treatment plan with patient and/or family-including medications, labs and radiology that may be ordered. Addressed questions and concerns during visit. Patient and/or family verbalized understanding and agree with plan.    As part of this patient's treatment plan I am prescribing controlled substances. The patient has been made aware of appropriate use of such medications, including potential risk of  somnolence, limited ability to drive and/or work safely, and potential for dependence or overdose. It has also been made clear that these medications are for use by the patient only, without concomitant use of alcohol or other substances unless prescribed. Keep out of reach of children.  Jass report has been reviewed. If this is going to be prescribed long term, [facility] [place]rolled [place] Agreement Contract has also been read and signed by patient and myself.    Patient instructions include: No driving or operating heavy machinery, solo bathing or tub baths for 90 days from onset of most recent seizure, if they currently hold a license. Minimize stress as much as possible. Recommended 7-8 hours of sleep each night. Abstain from alcohol intake. Educated on Antiepileptic medications with possible side effects and signs and symptoms to report if prescribed during visit. Instructed to take seizure medication daily if prescribed. Reviewed potential seizure risk factors. Instructed to call 911 or our office if another seizure does occur.    During this visit the following were done:  Labs Reviewed [x]    Labs Ordered []    Radiology Reports Reviewed [x]    Radiology Ordered []    PCP Records Reviewed []    Referring Provider Records Reviewed []    ER Records Reviewed []    Hospital Records Reviewed []    History Obtained From Family []    Radiology Images Reviewed []    Other Reviewed [x]    Records Requested []      05/07/25   11:18 EDT    Patient or patient representative verbalized consent for the use of Ambient Listening during the visit with  LAVELL Gonzalez for chart documentation. 5/7/2025  14:32 EDT    Note to patient: The 21st Century Cures Act makes medical notes like these available to patients in the interest of transparency. However, be advised this is a medical document. It is intended as peer to peer communication. It is written in medical language and may contain abbreviations or verbiage that  are unfamiliar. It may appear blunt or direct. Medical documents are intended to carry relevant information, facts as evident, and the clinical opinion of the provider.

## 2025-05-07 NOTE — PROGRESS NOTES
Subjective:     Patient ID: Erin Zavala is a 69 y.o. female.    CC:   Chief Complaint   Patient presents with    Seizures       HPI:   History of Present Illness  This is a pleasant 69-year-old female who was last seen in the clinic on 09/05/2024 for right carotid atherosclerosis, complex partial seizures present since 2006, and rare cluster headaches. She has been seizure-free since 2008 and has completely weaned off lamotrigine with no recurrent seizures. She does have Nayzilam rescue nasal spray if needed. She is currently on aspirin and Lipitor for the management of carotid atherosclerosis with normal CTA neck 10/2023 normal. She has followed with interventional pain medicine for chronic low back pain. At her last visit to the clinic, she reported weight loss and was encouraged to follow up with her primary care provider. She has sumatriptan if needed for cluster headaches. She is here for a follow-up.    She has undergone 3 steroid injections under the care of Dr. Asha Castanon with Replaced by [facility]. The first injection provided 2 months of pain relief, while the second was ineffective. The third injection was administered on 04/28/2025, and she believes it has reached its peak effect. Despite these treatments, she continues to experience pain and is considering surgical intervention. She has limited support in [place] and is contemplating surgery in [place], where her son and daughter-in-law reside. They have contacted [facility] and identified Dr. Barkley as a potential surgeon. He has requested her MRI of the Lumbar Spine images be sent to his clinic. Her appointment with Dr. Barkley is scheduled for 05/21/2025 at 9:00 AM. She has been diagnosed with spondylolisthesis and experiences pain radiating down her right leg, extending to the side of her calf, crossing over the top of her foot, and into her big toe. She is not currently using any muscle relaxers. She  "reports no falls in the past year but did experience mild dizziness following her last injection on 04/28/2025. This dizziness typically occurs when she first lies down or stands up and resolves within 30 seconds. She is uncertain if this symptom is related to the injections. She admits to inadequate water intake and reports no numbness, tingling, or sudden loss of feeling in her private area.    She has not experienced any seizures or cluster headaches. She has not required a refill of sumatriptan for an extended period. She is uncertain about the need for a refill of Nayzilam.    She continues to take aspirin and cholesterol medication.    MEDICATIONS  Current: Aspirin, Lipitor, sumatriptan, Nayzilam (as needed).    Prior significant neurology workup and history:  Partial epilepsy that began in 2006. She does have some focus issues. She has had no confusion, staring spells or other issues.  Last seizure in 2008 to her knowledge.      She was on a flight in July 2021 and developed a severe right sided headache with eye pain, felt like needles in her eye, eye watering, lasted 10 minutes of severe pain and then 24 hours of a dull ache. She had some issues with her insurance switching to medicare so she went 2 months without insurance so she didn't see her PCP or explain the symptoms until her regular follow up in November/December 2021. She did have a few more mild episodes since then that caused pain for 10 minutes but no visual changes, no speech changes, no numbness, tingling or weakness with symptoms. Saw PCP and had TSH, CMP, CBC< LDL, FT4 and Sed rate checked and all were normal per provider portal patient showed me during visit today. She did undergo repeat MRI brain w/o contrast 12/15/2021-we reviewed radiology report and imaging previously and this shows chronic small vessel ischemic changes and no acute abnormalities. Agree with radiology report. She is on aspirin, statin, HTN therapy. She did feel \"very " "strange\" while taking the medicine but it definitely helped. She is unsure of any triggers. History of migraines previously when she has menstrual cycles.      Previously followed Dr. [facility]don[facility]. Prior spells of confusion confirmed seizures in 2007. She has no history of traumatic brain injury or CNS infection.  She has had a normal brain MRI in 2007 and an EEG at [facility] in 2007 by Dr. Malloy showed left hemisphere slow waves and sharps. Sleep deprived EEG in 2012 was normal. EEG awake and drowsy completely normal 6/29/19. 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 She reports she has been experiencing episodes of vasovagal syncope since she was a child.      Partial complex epilepsy, which began in 2006. Her last seizure, to her knowledge, was in 2008. We have also been treating her for episodic cluster headaches with normal neuroimaging as far as no acute abnormalities, and have prescribed sumatriptan low dose to take as needed.      X-ray of the lumbar spine was completed on 10/21/2022 showing advanced degenerative disc changes. MRI of the lumbar spine obtained 12/10/2022 showing multiple levels of bulging disc. Options were presented to the patient as physical therapy, interventional pain specialist, or neurosurgery. Patient wanted to go to physical therapy as well as evaluation with pain management, so those orders were placed. She opted not to completed emg/ncvs or see pain management. Symptoms improved. Severe needle phobia.     She believes she was taking her medicine regularly during her EEG in 2019. She has been taking lamotrigine ER since 2006. Has weaned off Lamotrigine slowly and off since June 2023.      08/31/2023, she underwent a CTA of the head completed at [facility] for reports of an abnormal CT scan. This was ordered by her primary care provider. Per radiology alone, this " shows arterial mural calcification in the right carotid siphon. No other significant stenosis was identified. Incidentally, she did undergo a cone beam CT by her dental specialist, and this did note some concern of calcified atheroma around the [place] of the carter, and this is when that imaging was ordered. She was referred to [facility] Dr. Shivam Laird and was not evaluated, but recommended medication management.     X-ray of the lumbar spine showed multilevel thoracic and lumbar spondylosis which was moderate     7/2024 ordered by PCP. MRI of the lumbar spine shows multiple levels of bulging disks and arthritis, neuroforaminal and central spinal stenosis 8/19/2024-relatively stable to slightly advanced compared to 2022 imaging.     The following portions of the patient's history were reviewed and updated as appropriate: allergies, current medications, past family history, past medical history, past social history, past surgical history, and problem list.    Past Medical History:   Diagnosis Date    Carotid atherosclerosis, right 10/13/2023    Diarrhea     Episodic cluster headache, not intractable 01/14/2022    H/O electroencephalogram     History of MRI 2007    brain , normal     Hyperlipidemia     Hypertension     Low back pain     Lumbar radiculopathy, right 03/24/2023    Obstructive sleep apnea 10/13/2023    Seizure     Vomiting        Past Surgical History:   Procedure Laterality Date    CHOLECYSTECTOMY      ERCP AND ENDOSCOPY         Social History     Socioeconomic History    Marital status:    Tobacco Use    Smoking status: Never    Smokeless tobacco: Never   Vaping Use    Vaping status: Never Used   Substance and Sexual Activity    Alcohol use: No     Comment: occasional    Drug use: No    Sexual activity: Not Currently     Partners: Male     Birth control/protection: Abstinence, Post-menopausal       Family History   Problem Relation Age of Onset    Alzheimer's disease Mother      "Dementia Mother     Breast cancer Mother 62    Cancer Mother     Heart disease Father     Heart failure Father     Hypertension Father     Leukemia Sister     Leukemia Brother     Cancer Maternal Grandmother     No Known Problems Paternal Grandmother     Breast cancer Maternal Aunt 62    Breast cancer Paternal Aunt 42    Stroke Maternal Grandfather 39    Heart disease Paternal Grandfather     Heart failure Paternal Grandfather     Ovarian cancer Neg Hx           Current Outpatient Medications:     acyclovir (ZOVIRAX) 800 MG tablet, Take 1 tablet by mouth., Disp: , Rfl:     alendronate (FOSAMAX) 70 MG tablet, Take 1 tablet by mouth Every 7 (Seven) Days., Disp: , Rfl:     amLODIPine (NORVASC) 10 MG tablet, Take 1 tablet by mouth Daily., Disp: , Rfl:     aspirin 81 MG tablet, Take 1 tablet by mouth Daily., Disp: , Rfl:     atorvastatin (LIPITOR) 20 MG tablet, Take 1 tablet by mouth Daily., Disp: , Rfl:     celecoxib (CeleBREX) 200 MG capsule, , Disp: , Rfl:     Nayzilam 5 MG/0.1ML solution, Administer 0.1 mL into the nostril(s) as directed by provider Daily As Needed (at onset of seizure, may repeat once in opposite nostril in 10 minutes if needed)., Disp: 1 mL, Rfl: 0    pregabalin (LYRICA) 50 MG capsule, Take 1 capsule by mouth Daily., Disp: , Rfl:     SUMAtriptan (IMITREX) 25 MG tablet, Take one tablet at onset of headache. May repeat dose one time in 2 hours if headache not relieved., Disp: 9 tablet, Rfl: 5    zolpidem CR (AMBIEN CR) 12.5 MG CR tablet, Take 1 tablet by mouth Daily., Disp: , Rfl:      Review of Systems   Musculoskeletal:  Positive for back pain.   Neurological:  Negative for seizures and headaches.   All other systems reviewed and are negative.       Objective:  /74   Pulse 79   Ht 162.6 cm (64\")   Wt 76.4 kg (168 lb 6.4 oz)   SpO2 95%   BMI 28.91 kg/m²     Neurological Exam  Mental Status  Alert. Oriented to person, place, time and situation. Memory is normal. Recent and remote memory " are intact. Speech is normal. Language is fluent with no aphasia. Attention and concentration are normal. Fund of knowledge is appropriate for level of education.     Cranial Nerves  CN II: Visual acuity is normal. Visual fields full to confrontation.  CN III, IV, VI: Extraocular movements intact bilaterally. Normal lids and orbits bilaterally. Pupils equal round and reactive to light bilaterally.  CN V: Facial sensation is normal.  CN VII: Full and symmetric facial movement.  CN IX, X: Palate elevates symmetrically. Normal gag reflex.  CN XI: Shoulder shrug strength is normal.  CN XII: Tongue midline without atrophy or fasciculations.     Motor  Normal muscle bulk throughout. No fasciculations present. Normal muscle tone. No abnormal involuntary movements. Strength is 5/5 throughout all four extremities.     Sensory  Light touch abnormality: Pinprick abnormality: Temperature abnormality: Vibration abnormality: Proprioception is normal in upper and lower extremities.   All significantly decreased sensation RLE, Normal LLE.     Reflexes  Right Plantar: downgoing  Left Plantar: downgoing     Right pathological reflexes: Ankle clonus absent.  Left pathological reflexes: Ankle clonus absent.     Coordination     Finger-to-nose, rapid alternating movements and heel-to-shin normal bilaterally without dysmetria.     Gait  Casual gait: Wide stance. Normal stride length. Antalgic gait. Mild antalgia. Normal right arm swing. Normal left arm swing.Normal toe walking. Normal heel walking. Normal tandem gait. Romberg is absent. Able to rise from chair without using arms.     Physical Exam  Constitutional:       Appearance: Normal appearance.   Eyes:      General: Lids are normal.      Extraocular Movements: Extraocular movements intact.      Pupils: Pupils are equal, round, and reactive to light.   Musculoskeletal:      Lumbar back: Spasms and tenderness (right side-chronic) present. No swelling, edema, deformity, signs of  trauma, lacerations or bony tenderness. Decreased range of motion.   Neurological:      Mental Status: She is alert.      Motor: Motor strength is normal.     Coordination: Coordination is intact. Romberg sign negative.   Psychiatric:         Mood and Affect: Mood is anxious.         Speech: Speech normal.         Behavior: Behavior normal.         Thought Content: Thought content normal.         Cognition and Memory: Cognition and memory normal.         Judgment: Judgment normal.     Results:  Results    XR Spine Lumbar Complete With Flex & Ext  Result Date: 2/21/2025  Impression: Scoliosis and lumbar degenerative disease with no evidence of instability Electronically Signed: Alvaro Willams  2/21/2025 1:26 PM EST  Workstation ID: [id number]    Assessment/Plan:     Diagnoses and all orders for this visit:    1. Spinal stenosis of lumbar region, unspecified whether neurogenic claudication present (Primary)  -     Ambulatory Referral to Spine Surgery    2. Spondylolisthesis of lumbar region  -     Ambulatory Referral to Spine Surgery    3. Partial symptomatic epilepsy with complex partial seizures, not intractable, without status epilepticus  -     Nayzilam 5 MG/0.1ML solution; Administer 0.1 mL into the nostril(s) as directed by provider Daily As Needed (at onset of seizure, may repeat once in opposite nostril in 10 minutes if needed).  Dispense: 1 mL; Refill: 0    4. Episodic cluster headache, not intractable    5. Atherosclerosis of both carotid arteries  Comments:  aspirin and statin continue. cta head no carotid stenosis.           Assessment & Plan  1. Chronic low back pain.  She has undergone three epidural steroid injections with minimal improvement. The first injection provided 2 months of pain relief, while the subsequent injections were less effective. She is considering surgery and has scheduled an appointment with Dr. Barkley at [facility] in [place] on 05/21/2025. A referral has been  placed, and efforts are underway to power share her neuroimaging to their clinic. She is advised to continue pushing fluids to manage occasional dizziness post-injection. If symptoms persist, she will notify the pain clinic.    2. Complex partial seizures.  She has been seizure-free since  and has weaned off lamotrigine without recurrence. Nayzilam rescue nasal spray will be refilled as it may have . She is advised to continue Nayzilam as needed in case of another seizure.    3. Cluster headaches.  She has not experienced any recent cluster headaches. She is advised to continue using sumatriptan sparingly if needed.    4. Right carotid atherosclerosis-resolved on CTA neck 10/2023.  She is currently on aspirin and Lipitor for management. She is advised to continue her current regimen of aspirin and statin therapy.    Follow-up  She will follow up in December or sooner if needed.    Reviewed medications, potential side effects and signs and symptoms to report. Discussed risk versus benefits of treatment plan with patient and/or family-including medications, labs and radiology that may be ordered. Addressed questions and concerns during visit. Patient and/or family verbalized understanding and agree with plan.    As part of this patient's treatment plan I am prescribing controlled substances. The patient has been made aware of appropriate use of such medications, including potential risk of somnolence, limited ability to drive and/or work safely, and potential for dependence or overdose. It has also been made clear that these medications are for use by the patient only, without concomitant use of alcohol or other substances unless prescribed. Keep out of reach of children.  Jass report has been reviewed. If this is going to be prescribed long term, [facility] Cont[place] Agreement Contract has also been read and signed by patient and myself.    Patient instructions include: No driving or operating heavy  machinery, solo bathing or tub baths for 90 days from onset of most recent seizure, if they currently hold a license. Minimize stress as much as possible. Recommended 7-8 hours of sleep each night. Abstain from alcohol intake. Educated on Antiepileptic medications with possible side effects and signs and symptoms to report if prescribed during visit. Instructed to take seizure medication daily if prescribed. Reviewed potential seizure risk factors. Instructed to call 911 or our office if another seizure does occur.    During this visit the following were done:  Labs Reviewed [x]    Labs Ordered []    Radiology Reports Reviewed [x]    Radiology Ordered []    PCP Records Reviewed []    Referring Provider Records Reviewed []    ER Records Reviewed []    Hospital Records Reviewed []    History Obtained From Family []    Radiology Images Reviewed []    Other Reviewed [x]    Records Requested []      05/07/25   11:18 EDT    Patient or patient representative verbalized consent for the use of Ambient Listening during the visit with  LAVELL Gonzalez for chart documentation. 5/7/2025  14:32 EDT    Note to patient: The 21st Century Cures Act makes medical notes like these available to patients in the interest of transparency. However, be advised this is a medical document. It is intended as peer to peer communication. It is written in medical language and may contain abbreviations or verbiage that are unfamiliar. It may appear blunt or direct. Medical documents are intended to carry relevant information, facts as evident, and the clinical opinion of the provider.

## 2025-05-08 ENCOUNTER — SPECIALTY PHARMACY (OUTPATIENT)
Dept: NEUROLOGY | Facility: CLINIC | Age: 70
End: 2025-05-08
Payer: MEDICARE

## 2025-05-08 NOTE — PROGRESS NOTES
Nayzilam Pa is approved but the copay is high due to deductible. Spoke with pt and pt is going to call her insurance about the cost. Cristy Bowen will send the script to her local pharmacy and we will enroll externally for PA tracking. I told Erin to save my office number in case she has any issues filling. She does have some Nayzilam at home but they are . Thankfully, she has never had to use them so she is just filling to have them on hand in case of emergency.

## 2025-05-08 NOTE — PROGRESS NOTES
Nayzilam prior auth approved through pharmacy benefit.  Effective: 5/7/25 through 5/7/26  Insurance: [employer] Part D  Key: [id number]

## 2025-05-14 ENCOUNTER — TELEPHONE (OUTPATIENT)
Dept: NEUROLOGY | Facility: CLINIC | Age: 70
End: 2025-05-14
Payer: MEDICARE

## 2025-05-14 NOTE — TELEPHONE ENCOUNTER
"----- Message from Cristy Bowen sent at 5/8/2025  8:18 AM EDT -----  Regarding: Request to send [facility] MRI imaging from [facility]  Please contact the [facility] Facility & let them know the process for Power Share or Mailing a Disc to them. Everything is in chart review. Thanks! This does not need to be a phone note. \"Facility fax a request to me @[phone] so I can get them set up for Power Share or Mail a DiscEnzo Library CoordinatorRadiology Department[address] [address]Xfrgn: [email or web address] VisionsWork: [phone]:     [phone]\"  "

## 2025-05-14 NOTE — TELEPHONE ENCOUNTER
Noted. I would just call Erin & let her know. She was also going to get a copy of the MRI L spine disc to take with her. Thank you.

## 2025-05-14 NOTE — TELEPHONE ENCOUNTER
I sent the referral along with a letter letting them know to request  to power share the MRI L Spine imaging after calling [facility] 3x without being able to speak with anyone